# Patient Record
Sex: FEMALE | Race: WHITE | NOT HISPANIC OR LATINO
[De-identification: names, ages, dates, MRNs, and addresses within clinical notes are randomized per-mention and may not be internally consistent; named-entity substitution may affect disease eponyms.]

---

## 2017-01-12 ENCOUNTER — APPOINTMENT (OUTPATIENT)
Dept: UROLOGY | Facility: CLINIC | Age: 42
End: 2017-01-12

## 2017-01-12 DIAGNOSIS — N39.46 MIXED INCONTINENCE: ICD-10-CM

## 2017-02-21 ENCOUNTER — APPOINTMENT (OUTPATIENT)
Dept: RHEUMATOLOGY | Facility: CLINIC | Age: 42
End: 2017-02-21

## 2017-03-28 ENCOUNTER — APPOINTMENT (OUTPATIENT)
Dept: INTERNAL MEDICINE | Facility: CLINIC | Age: 42
End: 2017-03-28

## 2017-03-28 VITALS
HEART RATE: 68 BPM | SYSTOLIC BLOOD PRESSURE: 115 MMHG | TEMPERATURE: 98.1 F | BODY MASS INDEX: 26.58 KG/M2 | DIASTOLIC BLOOD PRESSURE: 70 MMHG | HEIGHT: 63 IN | WEIGHT: 150 LBS

## 2017-03-28 DIAGNOSIS — R39.15 URGENCY OF URINATION: ICD-10-CM

## 2017-03-28 DIAGNOSIS — E11.9 TYPE 2 DIABETES MELLITUS W/OUT COMPLICATIONS: ICD-10-CM

## 2017-03-29 LAB
ALBUMIN SERPL ELPH-MCNC: 4.1 G/DL
ALP BLD-CCNC: 69 U/L
ALT SERPL-CCNC: 32 U/L
ANION GAP SERPL CALC-SCNC: 17 MMOL/L
AST SERPL-CCNC: 42 U/L
BILIRUB SERPL-MCNC: 0.4 MG/DL
BUN SERPL-MCNC: 16 MG/DL
CALCIUM SERPL-MCNC: 9.4 MG/DL
CHLORIDE SERPL-SCNC: 99 MMOL/L
CO2 SERPL-SCNC: 27 MMOL/L
CREAT SERPL-MCNC: 0.72 MG/DL
GLUCOSE SERPL-MCNC: 88 MG/DL
HBA1C MFR BLD HPLC: 8.3 %
POTASSIUM SERPL-SCNC: 4.4 MMOL/L
PROT SERPL-MCNC: 6.9 G/DL
SODIUM SERPL-SCNC: 143 MMOL/L

## 2017-09-29 ENCOUNTER — MEDICATION RENEWAL (OUTPATIENT)
Age: 42
End: 2017-09-29

## 2017-09-29 ENCOUNTER — OTHER (OUTPATIENT)
Age: 42
End: 2017-09-29

## 2019-03-21 ENCOUNTER — INPATIENT (INPATIENT)
Facility: HOSPITAL | Age: 44
LOS: 3 days | Discharge: HOME | DRG: 639 | End: 2019-03-25
Attending: INTERNAL MEDICINE | Admitting: INTERNAL MEDICINE
Payer: COMMERCIAL

## 2019-03-21 VITALS
HEART RATE: 97 BPM | TEMPERATURE: 98 F | RESPIRATION RATE: 17 BRPM | OXYGEN SATURATION: 100 % | WEIGHT: 110.01 LBS | DIASTOLIC BLOOD PRESSURE: 79 MMHG | SYSTOLIC BLOOD PRESSURE: 131 MMHG

## 2019-03-21 DIAGNOSIS — E10.10 TYPE 1 DIABETES MELLITUS WITH KETOACIDOSIS WITHOUT COMA: ICD-10-CM

## 2019-03-21 LAB
ACETONE SERPL-MCNC: ABNORMAL
ALBUMIN SERPL ELPH-MCNC: 3.2 G/DL — LOW (ref 3.5–5)
ALBUMIN SERPL ELPH-MCNC: 4.1 G/DL — SIGNIFICANT CHANGE UP (ref 3.5–5)
ALP SERPL-CCNC: 103 U/L — SIGNIFICANT CHANGE UP (ref 40–120)
ALT FLD-CCNC: 23 U/L DA — SIGNIFICANT CHANGE UP (ref 10–60)
ANION GAP SERPL CALC-SCNC: 10 MMOL/L — SIGNIFICANT CHANGE UP (ref 5–17)
ANION GAP SERPL CALC-SCNC: 19 MMOL/L — HIGH (ref 5–17)
APPEARANCE UR: CLEAR — SIGNIFICANT CHANGE UP
AST SERPL-CCNC: 20 U/L — SIGNIFICANT CHANGE UP (ref 10–40)
BASE EXCESS BLDV CALC-SCNC: -15.9 MMOL/L — LOW (ref -2–2)
BASOPHILS # BLD AUTO: 0.03 K/UL — SIGNIFICANT CHANGE UP (ref 0–0.2)
BASOPHILS NFR BLD AUTO: 0.4 % — SIGNIFICANT CHANGE UP (ref 0–2)
BILIRUB SERPL-MCNC: 0.8 MG/DL — SIGNIFICANT CHANGE UP (ref 0.2–1.2)
BILIRUB UR-MCNC: NEGATIVE — SIGNIFICANT CHANGE UP
BLOOD GAS COMMENTS, VENOUS: SIGNIFICANT CHANGE UP
BUN SERPL-MCNC: 12 MG/DL — SIGNIFICANT CHANGE UP (ref 7–18)
BUN SERPL-MCNC: 9 MG/DL — SIGNIFICANT CHANGE UP (ref 7–18)
CALCIUM SERPL-MCNC: 7.8 MG/DL — LOW (ref 8.4–10.5)
CALCIUM SERPL-MCNC: 8.8 MG/DL — SIGNIFICANT CHANGE UP (ref 8.4–10.5)
CHLORIDE SERPL-SCNC: 103 MMOL/L — SIGNIFICANT CHANGE UP (ref 96–108)
CHLORIDE SERPL-SCNC: 116 MMOL/L — HIGH (ref 96–108)
CHOLEST SERPL-MCNC: 151 MG/DL — SIGNIFICANT CHANGE UP (ref 10–199)
CO2 SERPL-SCNC: 12 MMOL/L — LOW (ref 22–31)
CO2 SERPL-SCNC: 18 MMOL/L — LOW (ref 22–31)
COLOR SPEC: YELLOW — SIGNIFICANT CHANGE UP
CREAT SERPL-MCNC: 0.8 MG/DL — SIGNIFICANT CHANGE UP (ref 0.5–1.3)
CREAT SERPL-MCNC: 1.01 MG/DL — SIGNIFICANT CHANGE UP (ref 0.5–1.3)
DIFF PNL FLD: NEGATIVE — SIGNIFICANT CHANGE UP
EOSINOPHIL # BLD AUTO: 0 K/UL — SIGNIFICANT CHANGE UP (ref 0–0.5)
EOSINOPHIL NFR BLD AUTO: 0 % — SIGNIFICANT CHANGE UP (ref 0–6)
FLU A RESULT: SIGNIFICANT CHANGE UP
FLU A RESULT: SIGNIFICANT CHANGE UP
FLUAV AG NPH QL: SIGNIFICANT CHANGE UP
FLUBV AG NPH QL: SIGNIFICANT CHANGE UP
GLUCOSE SERPL-MCNC: 499 MG/DL — CRITICAL HIGH (ref 70–99)
GLUCOSE SERPL-MCNC: 95 MG/DL — SIGNIFICANT CHANGE UP (ref 70–99)
GLUCOSE UR QL: 1000 MG/DL
HCG SERPL-ACNC: <1 MIU/ML — SIGNIFICANT CHANGE UP
HCO3 BLDV-SCNC: 12 MMOL/L — LOW (ref 21–29)
HCT VFR BLD CALC: 43.4 % — SIGNIFICANT CHANGE UP (ref 34.5–45)
HDLC SERPL-MCNC: 36 MG/DL — LOW
HGB BLD-MCNC: 13.6 G/DL — SIGNIFICANT CHANGE UP (ref 11.5–15.5)
HOROWITZ INDEX BLDV+IHG-RTO: 21 — SIGNIFICANT CHANGE UP
IMM GRANULOCYTES NFR BLD AUTO: 0.3 % — SIGNIFICANT CHANGE UP (ref 0–1.5)
INR BLD: 1 RATIO — SIGNIFICANT CHANGE UP (ref 0.88–1.16)
KETONES UR-MCNC: ABNORMAL
LACTATE SERPL-SCNC: 2.2 MMOL/L — HIGH (ref 0.7–2)
LEUKOCYTE ESTERASE UR-ACNC: NEGATIVE — SIGNIFICANT CHANGE UP
LIDOCAIN IGE QN: 58 U/L — LOW (ref 73–393)
LIPID PNL WITH DIRECT LDL SERPL: 93 MG/DL — SIGNIFICANT CHANGE UP
LYMPHOCYTES # BLD AUTO: 1.54 K/UL — SIGNIFICANT CHANGE UP (ref 1–3.3)
LYMPHOCYTES # BLD AUTO: 21.9 % — SIGNIFICANT CHANGE UP (ref 13–44)
MCHC RBC-ENTMCNC: 30.8 PG — SIGNIFICANT CHANGE UP (ref 27–34)
MCHC RBC-ENTMCNC: 31.3 GM/DL — LOW (ref 32–36)
MCV RBC AUTO: 98.2 FL — SIGNIFICANT CHANGE UP (ref 80–100)
MONOCYTES # BLD AUTO: 0.47 K/UL — SIGNIFICANT CHANGE UP (ref 0–0.9)
MONOCYTES NFR BLD AUTO: 6.7 % — SIGNIFICANT CHANGE UP (ref 2–14)
NEUTROPHILS # BLD AUTO: 4.98 K/UL — SIGNIFICANT CHANGE UP (ref 1.8–7.4)
NEUTROPHILS NFR BLD AUTO: 70.7 % — SIGNIFICANT CHANGE UP (ref 43–77)
NITRITE UR-MCNC: NEGATIVE — SIGNIFICANT CHANGE UP
NRBC # BLD: 0 /100 WBCS — SIGNIFICANT CHANGE UP (ref 0–0)
PCO2 BLDV: 35 MMHG — SIGNIFICANT CHANGE UP (ref 35–50)
PH BLDV: 7.16 — CRITICAL LOW (ref 7.35–7.45)
PH UR: 5 — SIGNIFICANT CHANGE UP (ref 5–8)
PLATELET # BLD AUTO: 236 K/UL — SIGNIFICANT CHANGE UP (ref 150–400)
PO2 BLDV: 53 MMHG — HIGH (ref 25–45)
POTASSIUM SERPL-MCNC: 3.6 MMOL/L — SIGNIFICANT CHANGE UP (ref 3.5–5.3)
POTASSIUM SERPL-MCNC: 5.1 MMOL/L — SIGNIFICANT CHANGE UP (ref 3.5–5.3)
POTASSIUM SERPL-SCNC: 3.6 MMOL/L — SIGNIFICANT CHANGE UP (ref 3.5–5.3)
POTASSIUM SERPL-SCNC: 5.1 MMOL/L — SIGNIFICANT CHANGE UP (ref 3.5–5.3)
PROT SERPL-MCNC: 8.3 G/DL — SIGNIFICANT CHANGE UP (ref 6–8.3)
PROT UR-MCNC: NEGATIVE — SIGNIFICANT CHANGE UP
PROTHROM AB SERPL-ACNC: 11.1 SEC — SIGNIFICANT CHANGE UP (ref 10–12.9)
RBC # BLD: 4.42 M/UL — SIGNIFICANT CHANGE UP (ref 3.8–5.2)
RBC # FLD: 13.2 % — SIGNIFICANT CHANGE UP (ref 10.3–14.5)
RSV RESULT: SIGNIFICANT CHANGE UP
RSV RNA RESP QL NAA+PROBE: SIGNIFICANT CHANGE UP
SAO2 % BLDV: 79 % — SIGNIFICANT CHANGE UP (ref 67–88)
SODIUM SERPL-SCNC: 134 MMOL/L — LOW (ref 135–145)
SODIUM SERPL-SCNC: 144 MMOL/L — SIGNIFICANT CHANGE UP (ref 135–145)
SP GR SPEC: 1.02 — SIGNIFICANT CHANGE UP (ref 1.01–1.02)
TOTAL CHOLESTEROL/HDL RATIO MEASUREMENT: 4.2 RATIO — SIGNIFICANT CHANGE UP (ref 3.3–7.1)
TRIGL SERPL-MCNC: 110 MG/DL — SIGNIFICANT CHANGE UP (ref 10–149)
TSH SERPL-MCNC: 1.43 UU/ML — SIGNIFICANT CHANGE UP (ref 0.34–4.82)
UROBILINOGEN FLD QL: NEGATIVE — SIGNIFICANT CHANGE UP
WBC # BLD: 7.04 K/UL — SIGNIFICANT CHANGE UP (ref 3.8–10.5)
WBC # FLD AUTO: 7.04 K/UL — SIGNIFICANT CHANGE UP (ref 3.8–10.5)

## 2019-03-21 PROCEDURE — 99285 EMERGENCY DEPT VISIT HI MDM: CPT

## 2019-03-21 PROCEDURE — 99291 CRITICAL CARE FIRST HOUR: CPT

## 2019-03-21 PROCEDURE — 71046 X-RAY EXAM CHEST 2 VIEWS: CPT | Mod: 26

## 2019-03-21 PROCEDURE — 93010 ELECTROCARDIOGRAM REPORT: CPT

## 2019-03-21 RX ORDER — DEXTROSE 50 % IN WATER 50 %
50 SYRINGE (ML) INTRAVENOUS ONCE
Qty: 0 | Refills: 0 | Status: COMPLETED | OUTPATIENT
Start: 2019-03-21 | End: 2019-03-21

## 2019-03-21 RX ORDER — SODIUM CHLORIDE 9 MG/ML
1000 INJECTION, SOLUTION INTRAVENOUS ONCE
Qty: 0 | Refills: 0 | Status: COMPLETED | OUTPATIENT
Start: 2019-03-21 | End: 2019-03-21

## 2019-03-21 RX ORDER — DEXTROSE MONOHYDRATE, SODIUM CHLORIDE, AND POTASSIUM CHLORIDE 50; .745; 4.5 G/1000ML; G/1000ML; G/1000ML
1000 INJECTION, SOLUTION INTRAVENOUS
Qty: 0 | Refills: 0 | Status: DISCONTINUED | OUTPATIENT
Start: 2019-03-21 | End: 2019-03-22

## 2019-03-21 RX ORDER — INSULIN LISPRO 100/ML
15 VIAL (ML) SUBCUTANEOUS ONCE
Qty: 0 | Refills: 0 | Status: COMPLETED | OUTPATIENT
Start: 2019-03-21 | End: 2019-03-21

## 2019-03-21 RX ORDER — PANTOPRAZOLE SODIUM 20 MG/1
40 TABLET, DELAYED RELEASE ORAL DAILY
Qty: 0 | Refills: 0 | Status: DISCONTINUED | OUTPATIENT
Start: 2019-03-21 | End: 2019-03-23

## 2019-03-21 RX ORDER — SODIUM CHLORIDE 9 MG/ML
1000 INJECTION INTRAMUSCULAR; INTRAVENOUS; SUBCUTANEOUS
Qty: 0 | Refills: 0 | Status: DISCONTINUED | OUTPATIENT
Start: 2019-03-21 | End: 2019-03-21

## 2019-03-21 RX ORDER — ENOXAPARIN SODIUM 100 MG/ML
40 INJECTION SUBCUTANEOUS DAILY
Qty: 0 | Refills: 0 | Status: DISCONTINUED | OUTPATIENT
Start: 2019-03-21 | End: 2019-03-25

## 2019-03-21 RX ORDER — INFLUENZA VIRUS VACCINE 15; 15; 15; 15 UG/.5ML; UG/.5ML; UG/.5ML; UG/.5ML
0.5 SUSPENSION INTRAMUSCULAR ONCE
Qty: 0 | Refills: 0 | Status: DISCONTINUED | OUTPATIENT
Start: 2019-03-21 | End: 2019-03-25

## 2019-03-21 RX ORDER — SODIUM CHLORIDE 9 MG/ML
1000 INJECTION INTRAMUSCULAR; INTRAVENOUS; SUBCUTANEOUS ONCE
Qty: 0 | Refills: 0 | Status: COMPLETED | OUTPATIENT
Start: 2019-03-21 | End: 2019-03-21

## 2019-03-21 RX ORDER — POTASSIUM CHLORIDE 20 MEQ
10 PACKET (EA) ORAL
Qty: 0 | Refills: 0 | Status: COMPLETED | OUTPATIENT
Start: 2019-03-21 | End: 2019-03-22

## 2019-03-21 RX ORDER — INSULIN HUMAN 100 [IU]/ML
5 INJECTION, SOLUTION SUBCUTANEOUS
Qty: 100 | Refills: 0 | Status: DISCONTINUED | OUTPATIENT
Start: 2019-03-21 | End: 2019-03-22

## 2019-03-21 RX ORDER — INSULIN LISPRO 100/ML
10 VIAL (ML) SUBCUTANEOUS
Qty: 0 | Refills: 0 | COMMUNITY

## 2019-03-21 RX ORDER — ONDANSETRON 8 MG/1
4 TABLET, FILM COATED ORAL ONCE
Qty: 0 | Refills: 0 | Status: COMPLETED | OUTPATIENT
Start: 2019-03-21 | End: 2019-03-21

## 2019-03-21 RX ADMIN — Medication 15 UNIT(S): at 17:34

## 2019-03-21 RX ADMIN — DEXTROSE MONOHYDRATE, SODIUM CHLORIDE, AND POTASSIUM CHLORIDE 100 MILLILITER(S): 50; .745; 4.5 INJECTION, SOLUTION INTRAVENOUS at 22:43

## 2019-03-21 RX ADMIN — Medication 100 MILLIEQUIVALENT(S): at 23:50

## 2019-03-21 RX ADMIN — SODIUM CHLORIDE 4000 MILLILITER(S): 9 INJECTION, SOLUTION INTRAVENOUS at 19:09

## 2019-03-21 RX ADMIN — Medication 100 MILLIEQUIVALENT(S): at 22:44

## 2019-03-21 RX ADMIN — INSULIN HUMAN 5 UNIT(S)/HR: 100 INJECTION, SOLUTION SUBCUTANEOUS at 19:48

## 2019-03-21 RX ADMIN — SODIUM CHLORIDE 4000 MILLILITER(S): 9 INJECTION INTRAMUSCULAR; INTRAVENOUS; SUBCUTANEOUS at 17:34

## 2019-03-21 RX ADMIN — Medication 50 MILLILITER(S): at 22:31

## 2019-03-21 RX ADMIN — ONDANSETRON 4 MILLIGRAM(S): 8 TABLET, FILM COATED ORAL at 17:34

## 2019-03-21 RX ADMIN — SODIUM CHLORIDE 2000 MILLILITER(S): 9 INJECTION, SOLUTION INTRAVENOUS at 19:48

## 2019-03-21 NOTE — H&P ADULT - NSHPLABSRESULTS_GEN_ALL_CORE
Vital Signs Last 24 Hrs  T(C): 36.8 (21 Mar 2019 16:22), Max: 36.8 (21 Mar 2019 16:22)  T(F): 98.3 (21 Mar 2019 16:22), Max: 98.3 (21 Mar 2019 16:22)  HR: 97 (21 Mar 2019 16:22) (97 - 97)  BP: 131/79 (21 Mar 2019 16:22) (131/79 - 131/79)  BP(mean): --  RR: 17 (21 Mar 2019 16:22) (17 - 17)  SpO2: 100% (21 Mar 2019 16:22) (100% - 100%)      Labs:                        13.6   7.04  )-----------( 236      ( 21 Mar 2019 17:40 )             43.4     03-21    134<L>  |  103  |  12  ----------------------------<  499<HH>  5.1   |  12<L>  |  1.01    Ca    8.8      21 Mar 2019 17:40    TPro  8.3  /  Alb  4.1  /  TBili  0.8  /  DBili  x   /  AST  20  /  ALT  23  /  AlkPhos  103  03-21

## 2019-03-21 NOTE — ED ADULT NURSE REASSESSMENT NOTE - NS ED NURSE REASSESS COMMENT FT1
Received pt from PANCHO Garrett, pt is observed laying in bed, breathing room air, in no respiratory distress at time of assessment. Pt is A&O x3, able to make needs known, denies any distress at this time. Insulin started at about 8pm, at 5ml/hr, pt tolerated well. Skin intact, right AC #20Ga and left AC #18Ga in place. Admitted to ICU, report given to PNACHO Stover for ICU room 6. Pt transported on stretcher, on monitor, accompanied by Dr. Carrasco and PANCHO Segura.

## 2019-03-21 NOTE — ED PROVIDER NOTE - CLINICAL SUMMARY MEDICAL DECISION MAKING FREE TEXT BOX
ro dka, prob gastro, clinically unlikely to be surgical abdominal pathology / will check cxr for pna. fluids. insulin. re-assess.

## 2019-03-21 NOTE — ED PROVIDER NOTE - CRITICAL CARE PROVIDED
interpretation of diagnostic studies/consultation with other physicians/additional history taking/documentation/consult w/ pt's family directly relating to pts condition/direct patient care (not related to procedure)

## 2019-03-21 NOTE — ED PROVIDER NOTE - PROGRESS NOTE DETAILS
pt w dka, no sign that it is secondary to another treatable illness. will admit to icu / insulin gtt / fluids

## 2019-03-21 NOTE — H&P ADULT - ATTENDING COMMENTS
Patient is a 42 y/o female with h/o type 1 DM who had been managed with an insulin pump that was discontinued and now presents with nausea and vomiting with blood sugar readings of >500. Her anion gap is 19 and our electrolyte profile shows a blood sugar of 499 mg/dl. The serum and urine are + for acetone. The patient received 1L isotonic fluid in the ED and is admitted to ICU for further fluid resuscitation and an insulin drip. There is no c/o chest pain, fever, cough or dysuria. CXR is without infiltrate. Dx- Diabetic ketoacidosis. I reviewed all labs, xrays and other provider notes. Critical care time 30 minutes.

## 2019-03-21 NOTE — H&P ADULT - NSHPPHYSICALEXAM_GEN_ALL_CORE
PHYSICAL EXAM:    GENERAL: dehydrated    HEAD:  Atraumatic, Normocephalic    NECK: Supple, No JVD    CHEST/LUNG: Clear to auscultation bilaterally; No wheeze    HEART: Regular rate and rhythm; No murmurs, rubs, or gallops    ABDOMEN: Soft, mildly tender on epigastric and LUQ, Nondistended; Bowel sounds present    EXTREMITIES:  2+ Peripheral Pulses, No clubbing, cyanosis, or edema    PSYCH: AAOx3    NEUROLOGY: non-focal    SKIN: No rashes or lesions    No other pertinent positive finding except mentioned as above.

## 2019-03-21 NOTE — H&P ADULT - NSHPREVIEWOFSYSTEMS_GEN_ALL_CORE
REVIEW OF SYSTEMS:    CONSTITUTIONAL: No weakness, fevers or chills  EYES/ENT: No visual changes;  No vertigo or throat pain   NECK: No pain or stiffness  RESPIRATORY: No cough, wheezing, hemoptysis; No shortness of breath  CARDIOVASCULAR: No chest pain or palpitations  GASTROINTESTINAL: mild epigastric pain with nausea, vomiting, NO hematemesis; No diarrhea or constipation. No melena or hematochezia.  GENITOURINARY: No dysuria, frequency or hematuria  NEUROLOGICAL: No numbness or weakness  SKIN: No itching, rashes  No other complaint except mentioned as above.

## 2019-03-21 NOTE — ED ADULT NURSE NOTE - NSIMPLEMENTINTERV_GEN_ALL_ED
Implemented All Universal Safety Interventions:  [place] to call system. Call bell, personal items and telephone within reach. Instruct patient to call for assistance. Room bathroom lighting operational. Non-slip footwear when patient is off stretcher. Physically safe environment: no spills, clutter or unnecessary equipment. Stretcher in lowest position, wheels locked, appropriate side rails in place.

## 2019-03-21 NOTE — ED PROVIDER NOTE - OBJECTIVE STATEMENT
43 female hx t1dm since childhood, used to be on pump but since insurance changed 1-2 yrs ago is on glargine 10 humalog 10 tid. does not follow w endocrine any longer. does correct 1 unit per 15g carbs but no correction for bg level. has had vomiting and nausea for 2-3 days and now w 1 day LUQ discomfort. no cp or sob. no pleuritic. has been ok to take insulin but her bg have been reading over 600. dw ems and they state bg was HI. give 500 cc fluids pta.

## 2019-03-21 NOTE — H&P ADULT - ASSESSMENT
43 F with DM 1 presented with DKA with .     1)DKA  , HCO2 12, AG 19, Acetone large, pH 7.16   s/p 2L NS in ED  -will give another LR bolus start on insulin drip   -can start on NS, K5.1- no need for K replacement now  -will repeat BMP, lactate, VBG Q4 and adjust IVF and K accordingly  -NPO     2)Metabolic acidosis from DKA  IV hydration and repeat VBG Q4 with lab for now until pH normalize    3)DVT and GI PPx  Protonix and Levonox

## 2019-03-21 NOTE — ED PROVIDER NOTE - PHYSICAL EXAMINATION
Gen: well appearing, of stated age, no acute distress; Head: NC, AT; ENT: MMM, no uvular deviation; Neck: supple with full ROM; Chest: CTAB, no retractions, rate normal, appears to breath comfortable; Heart: RRR S1S2 No JVD No peripheral edema b/l ; Abd: Soft mild luq tender, no rebound or guarding, no masses, no keene sign, no mcburney tenderness, no CVAT; Back: No spinal deformity; Ext: Moving all 4 limbs without obvious impairment to ROM, no obvious weakness; Neuro: fluid speech, no focal deficits, oriented to person, place, situation; Psych: No anxiety, depression or pressured speech noted; Skin: no utricaria, no diffuse rash. -ncohen

## 2019-03-21 NOTE — H&P ADULT - HISTORY OF PRESENT ILLNESS
43 F with history of DM type 1 diagnosed at 7 year old  from home presented with nausea and vomiting for 1 days and found to have BS >500 at home. Patient was on insulin pump since she was diagnosed at 7 and cut off 2 years ago for insurance and started on Basaglar 10 U at night and Humalog 10U mealtime/TID and compliance with medication and diet. Her BS range from 40 to 200s, did not have severe hyperglycemic attack since she was diagnosed. Pt also endorsed some discomfort in epigastric and LUQ area. Denied recent flu, diarrhea, fever, chills, dysuria and any other symptoms.   In ED,  vitals were stable. , HCO2 12, AG 19, Acetone large, pH 7.16 on presentation. pt receive 2L NS and 15U Humalog

## 2019-03-22 LAB
24R-OH-CALCIDIOL SERPL-MCNC: 13.7 NG/ML — LOW (ref 30–80)
ALBUMIN SERPL ELPH-MCNC: 3 G/DL — LOW (ref 3.5–5)
ALP SERPL-CCNC: 77 U/L — SIGNIFICANT CHANGE UP (ref 40–120)
ALT FLD-CCNC: 19 U/L DA — SIGNIFICANT CHANGE UP (ref 10–60)
ANION GAP SERPL CALC-SCNC: 6 MMOL/L — SIGNIFICANT CHANGE UP (ref 5–17)
ANION GAP SERPL CALC-SCNC: 9 MMOL/L — SIGNIFICANT CHANGE UP (ref 5–17)
AST SERPL-CCNC: 16 U/L — SIGNIFICANT CHANGE UP (ref 10–40)
BASE EXCESS BLDV CALC-SCNC: -6.6 MMOL/L — LOW (ref -2–2)
BASOPHILS # BLD AUTO: 0.05 K/UL — SIGNIFICANT CHANGE UP (ref 0–0.2)
BASOPHILS NFR BLD AUTO: 0.5 % — SIGNIFICANT CHANGE UP (ref 0–2)
BILIRUB SERPL-MCNC: 0.5 MG/DL — SIGNIFICANT CHANGE UP (ref 0.2–1.2)
BLOOD GAS COMMENTS, VENOUS: SIGNIFICANT CHANGE UP
BUN SERPL-MCNC: 7 MG/DL — SIGNIFICANT CHANGE UP (ref 7–18)
BUN SERPL-MCNC: 8 MG/DL — SIGNIFICANT CHANGE UP (ref 7–18)
CALCIUM SERPL-MCNC: 7.7 MG/DL — LOW (ref 8.4–10.5)
CALCIUM SERPL-MCNC: 7.9 MG/DL — LOW (ref 8.4–10.5)
CHLORIDE SERPL-SCNC: 113 MMOL/L — HIGH (ref 96–108)
CHLORIDE SERPL-SCNC: 114 MMOL/L — HIGH (ref 96–108)
CO2 SERPL-SCNC: 18 MMOL/L — LOW (ref 22–31)
CO2 SERPL-SCNC: 21 MMOL/L — LOW (ref 22–31)
CREAT SERPL-MCNC: 0.64 MG/DL — SIGNIFICANT CHANGE UP (ref 0.5–1.3)
CREAT SERPL-MCNC: 0.66 MG/DL — SIGNIFICANT CHANGE UP (ref 0.5–1.3)
CULTURE RESULTS: NO GROWTH — SIGNIFICANT CHANGE UP
EOSINOPHIL # BLD AUTO: 0.04 K/UL — SIGNIFICANT CHANGE UP (ref 0–0.5)
EOSINOPHIL NFR BLD AUTO: 0.4 % — SIGNIFICANT CHANGE UP (ref 0–6)
FOLATE SERPL-MCNC: 10.9 NG/ML — SIGNIFICANT CHANGE UP
GLUCOSE SERPL-MCNC: 109 MG/DL — HIGH (ref 70–99)
GLUCOSE SERPL-MCNC: 206 MG/DL — HIGH (ref 70–99)
HCO3 BLDV-SCNC: 19 MMOL/L — LOW (ref 21–29)
HCT VFR BLD CALC: 36.3 % — SIGNIFICANT CHANGE UP (ref 34.5–45)
HGB BLD-MCNC: 11.5 G/DL — SIGNIFICANT CHANGE UP (ref 11.5–15.5)
HOROWITZ INDEX BLDV+IHG-RTO: 21 — SIGNIFICANT CHANGE UP
IMM GRANULOCYTES NFR BLD AUTO: 0.2 % — SIGNIFICANT CHANGE UP (ref 0–1.5)
LACTATE SERPL-SCNC: 0.6 MMOL/L — LOW (ref 0.7–2)
LYMPHOCYTES # BLD AUTO: 1.86 K/UL — SIGNIFICANT CHANGE UP (ref 1–3.3)
LYMPHOCYTES # BLD AUTO: 18.2 % — SIGNIFICANT CHANGE UP (ref 13–44)
MAGNESIUM SERPL-MCNC: 1.5 MG/DL — LOW (ref 1.6–2.6)
MAGNESIUM SERPL-MCNC: 1.8 MG/DL — SIGNIFICANT CHANGE UP (ref 1.6–2.6)
MCHC RBC-ENTMCNC: 30.6 PG — SIGNIFICANT CHANGE UP (ref 27–34)
MCHC RBC-ENTMCNC: 31.7 GM/DL — LOW (ref 32–36)
MCV RBC AUTO: 96.5 FL — SIGNIFICANT CHANGE UP (ref 80–100)
MONOCYTES # BLD AUTO: 0.94 K/UL — HIGH (ref 0–0.9)
MONOCYTES NFR BLD AUTO: 9.2 % — SIGNIFICANT CHANGE UP (ref 2–14)
NEUTROPHILS # BLD AUTO: 7.31 K/UL — SIGNIFICANT CHANGE UP (ref 1.8–7.4)
NEUTROPHILS NFR BLD AUTO: 71.5 % — SIGNIFICANT CHANGE UP (ref 43–77)
NRBC # BLD: 0 /100 WBCS — SIGNIFICANT CHANGE UP (ref 0–0)
PCO2 BLDV: 40 MMHG — SIGNIFICANT CHANGE UP (ref 35–50)
PH BLDV: 7.3 — LOW (ref 7.35–7.45)
PHOSPHATE SERPL-MCNC: 2.8 MG/DL — SIGNIFICANT CHANGE UP (ref 2.5–4.5)
PLATELET # BLD AUTO: 200 K/UL — SIGNIFICANT CHANGE UP (ref 150–400)
PO2 BLDV: 67 MMHG — HIGH (ref 25–45)
POTASSIUM SERPL-MCNC: 4.3 MMOL/L — SIGNIFICANT CHANGE UP (ref 3.5–5.3)
POTASSIUM SERPL-MCNC: 4.3 MMOL/L — SIGNIFICANT CHANGE UP (ref 3.5–5.3)
POTASSIUM SERPL-SCNC: 4.3 MMOL/L — SIGNIFICANT CHANGE UP (ref 3.5–5.3)
POTASSIUM SERPL-SCNC: 4.3 MMOL/L — SIGNIFICANT CHANGE UP (ref 3.5–5.3)
PROT SERPL-MCNC: 6.3 G/DL — SIGNIFICANT CHANGE UP (ref 6–8.3)
RBC # BLD: 3.76 M/UL — LOW (ref 3.8–5.2)
RBC # FLD: 13.2 % — SIGNIFICANT CHANGE UP (ref 10.3–14.5)
SAO2 % BLDV: 93 % — HIGH (ref 67–88)
SODIUM SERPL-SCNC: 140 MMOL/L — SIGNIFICANT CHANGE UP (ref 135–145)
SODIUM SERPL-SCNC: 141 MMOL/L — SIGNIFICANT CHANGE UP (ref 135–145)
SPECIMEN SOURCE: SIGNIFICANT CHANGE UP
VIT B12 SERPL-MCNC: 819 PG/ML — SIGNIFICANT CHANGE UP (ref 232–1245)
WBC # BLD: 10.22 K/UL — SIGNIFICANT CHANGE UP (ref 3.8–10.5)
WBC # FLD AUTO: 10.22 K/UL — SIGNIFICANT CHANGE UP (ref 3.8–10.5)

## 2019-03-22 RX ORDER — INSULIN LISPRO 100/ML
VIAL (ML) SUBCUTANEOUS AT BEDTIME
Qty: 0 | Refills: 0 | Status: DISCONTINUED | OUTPATIENT
Start: 2019-03-22 | End: 2019-03-25

## 2019-03-22 RX ORDER — ERGOCALCIFEROL 1.25 MG/1
50000 CAPSULE ORAL
Qty: 0 | Refills: 0 | Status: DISCONTINUED | OUTPATIENT
Start: 2019-03-22 | End: 2019-03-25

## 2019-03-22 RX ORDER — INSULIN LISPRO 100/ML
VIAL (ML) SUBCUTANEOUS
Qty: 0 | Refills: 0 | Status: DISCONTINUED | OUTPATIENT
Start: 2019-03-22 | End: 2019-03-25

## 2019-03-22 RX ORDER — ACETAMINOPHEN 500 MG
650 TABLET ORAL EVERY 6 HOURS
Qty: 0 | Refills: 0 | Status: DISCONTINUED | OUTPATIENT
Start: 2019-03-22 | End: 2019-03-25

## 2019-03-22 RX ORDER — INSULIN GLARGINE 100 [IU]/ML
8 INJECTION, SOLUTION SUBCUTANEOUS ONCE
Qty: 0 | Refills: 0 | Status: COMPLETED | OUTPATIENT
Start: 2019-03-22 | End: 2019-03-22

## 2019-03-22 RX ORDER — INSULIN GLARGINE 100 [IU]/ML
8 INJECTION, SOLUTION SUBCUTANEOUS EVERY MORNING
Qty: 0 | Refills: 0 | Status: DISCONTINUED | OUTPATIENT
Start: 2019-03-23 | End: 2019-03-25

## 2019-03-22 RX ORDER — MAGNESIUM SULFATE 500 MG/ML
2 VIAL (ML) INJECTION ONCE
Qty: 0 | Refills: 0 | Status: COMPLETED | OUTPATIENT
Start: 2019-03-22 | End: 2019-03-22

## 2019-03-22 RX ORDER — ACETAMINOPHEN 500 MG
650 TABLET ORAL ONCE
Qty: 0 | Refills: 0 | Status: COMPLETED | OUTPATIENT
Start: 2019-03-22 | End: 2019-03-22

## 2019-03-22 RX ADMIN — ENOXAPARIN SODIUM 40 MILLIGRAM(S): 100 INJECTION SUBCUTANEOUS at 11:46

## 2019-03-22 RX ADMIN — Medication 650 MILLIGRAM(S): at 02:51

## 2019-03-22 RX ADMIN — DEXTROSE MONOHYDRATE, SODIUM CHLORIDE, AND POTASSIUM CHLORIDE 100 MILLILITER(S): 50; .745; 4.5 INJECTION, SOLUTION INTRAVENOUS at 09:20

## 2019-03-22 RX ADMIN — ERGOCALCIFEROL 50000 UNIT(S): 1.25 CAPSULE ORAL at 17:04

## 2019-03-22 RX ADMIN — Medication 650 MILLIGRAM(S): at 15:41

## 2019-03-22 RX ADMIN — Medication 650 MILLIGRAM(S): at 03:30

## 2019-03-22 RX ADMIN — INSULIN GLARGINE 8 UNIT(S): 100 INJECTION, SOLUTION SUBCUTANEOUS at 04:33

## 2019-03-22 RX ADMIN — Medication 100 MILLIEQUIVALENT(S): at 00:47

## 2019-03-22 RX ADMIN — Medication 650 MILLIGRAM(S): at 13:54

## 2019-03-22 RX ADMIN — PANTOPRAZOLE SODIUM 40 MILLIGRAM(S): 20 TABLET, DELAYED RELEASE ORAL at 11:46

## 2019-03-22 RX ADMIN — Medication 50 GRAM(S): at 04:40

## 2019-03-22 NOTE — PROGRESS NOTE ADULT - SUBJECTIVE AND OBJECTIVE BOX
INTERVAL HPI/OVERNIGHT EVENTS: Pt was seen and examined at bedside.     Hematalogic:  enoxaparin Injectable 40 milliGRAM(s) SubCutaneous daily    Other:  dextrose 5% + sodium chloride 0.9% with potassium chloride 20 mEq/L 1000 milliLiter(s) IV Continuous <Continuous>  influenza   Vaccine 0.5 milliLiter(s) IntraMuscular once  insulin lispro (HumaLOG) corrective regimen sliding scale   SubCutaneous three times a day before meals  insulin lispro (HumaLOG) corrective regimen sliding scale   SubCutaneous at bedtime  pantoprazole  Injectable 40 milliGRAM(s) IV Push daily    dextrose 5% + sodium chloride 0.9% with potassium chloride 20 mEq/L 1000 milliLiter(s) IV Continuous <Continuous>  enoxaparin Injectable 40 milliGRAM(s) SubCutaneous daily  influenza   Vaccine 0.5 milliLiter(s) IntraMuscular once  insulin lispro (HumaLOG) corrective regimen sliding scale   SubCutaneous three times a day before meals  insulin lispro (HumaLOG) corrective regimen sliding scale   SubCutaneous at bedtime  pantoprazole  Injectable 40 milliGRAM(s) IV Push daily    Drug Dosing Weight    Weight (kg): 59.5 (21 Mar 2019 21:07)    PMH -reviewed admission note, no change since admission    ICU Vital Signs Last 24 Hrs  T(C): 36.7 (22 Mar 2019 09:00), Max: 37 (21 Mar 2019 21:07)  T(F): 98.1 (22 Mar 2019 09:00), Max: 98.6 (21 Mar 2019 21:07)  HR: 77 (22 Mar 2019 10:00) (73 - 97)  BP: 115/63 (22 Mar 2019 10:00) (102/54 - 131/79)  BP(mean): 75 (22 Mar 2019 10:00) (65 - 78)  ABP: --  ABP(mean): --  RR: 13 (22 Mar 2019 10:00) (10 - 21)  SpO2: 100% (22 Mar 2019 10:00) (98% - 100%)             @ 07:01  -   @ 07:00  --------------------------------------------------------  IN: 1308 mL / OUT: 350 mL / NET: 958 mL            PHYSICAL EXAM:    GENERAL: NAD, well-groomed, well-developed  HEAD:  Atraumatic, Normocephalic  EYES: EOMI, PERRLA, conjunctiva and sclera clear  ENMT: No tonsillar erythema, exudates, or enlargement; Moist mucous membranes, Good dentition, No lesions  NECK: Supple, normal appearance, No JVD; Normal thyroid; Trachea midline  NERVOUS SYSTEM:  Alert & Oriented X3, Good concentration; Motor Strength 5/5 B/L upper and lower extremities; DTRs 2+ intact and symmetric  CHEST/LUNG: No chest deformity; Normal percussion bilaterally; No rales, rhonchi, wheezing   HEART: Regular rate and rhythm; No murmurs, rubs, or gallops  ABDOMEN: Soft, Nontender, Nondistended; Bowel sounds present  EXTREMITIES:  2+ Peripheral Pulses, No clubbing, cyanosis, or edema  LYMPH: No lymphadenopathy noted  SKIN: No rashes or lesions; Good capillary refill      LABS:  CBC Full  -  ( 22 Mar 2019 02:30 )  WBC Count : 10.22 K/uL  Hemoglobin : 11.5 g/dL  Hematocrit : 36.3 %  Platelet Count - Automated : 200 K/uL  Mean Cell Volume : 96.5 fl  Mean Cell Hemoglobin : 30.6 pg  Mean Cell Hemoglobin Concentration : 31.7 gm/dL  Auto Neutrophil # : 7.31 K/uL  Auto Lymphocyte # : 1.86 K/uL  Auto Monocyte # : 0.94 K/uL  Auto Eosinophil # : 0.04 K/uL  Auto Basophil # : 0.05 K/uL  Auto Neutrophil % : 71.5 %  Auto Lymphocyte % : 18.2 %  Auto Monocyte % : 9.2 %  Auto Eosinophil % : 0.4 %  Auto Basophil % : 0.5 %        140  |  113<H>  |  7   ----------------------------<  206<H>  4.3   |  21<L>  |  0.66    Ca    7.9<L>      22 Mar 2019 09:57  Phos  2.8       Mg     1.8         TPro  6.3  /  Alb  3.0<L>  /  TBili  0.5  /  DBili  x   /  AST  16  /  ALT  19  /  AlkPhos  77      PT/INR - ( 21 Mar 2019 21:35 )   PT: 11.1 sec;   INR: 1.00 ratio           Urinalysis Basic - ( 21 Mar 2019 18:27 )    Color: Yellow / Appearance: Clear / S.020 / pH: x  Gluc: x / Ketone: Large  / Bili: Negative / Urobili: Negative   Blood: x / Protein: Negative / Nitrite: Negative   Leuk Esterase: Negative / RBC: x / WBC x   Sq Epi: x / Non Sq Epi: x / Bacteria: x          RADIOLOGY & ADDITIONAL STUDIES REVIEWED:  ***    GOALS OF CARE DISCUSSION WITH PATIENT/FAMILY/PROXY:    CRITICAL CARE TIME SPENT: 35 minutes

## 2019-03-22 NOTE — PROGRESS NOTE ADULT - SUBJECTIVE AND OBJECTIVE BOX
INTERVAL HPI/OVERNIGHT EVENTS:       Antimicrobial:    Cardiovascular:    Pulmonary:    Hematalogic:  enoxaparin Injectable 40 milliGRAM(s) SubCutaneous daily    Other:  acetaminophen   Tablet .. 650 milliGRAM(s) Oral every 6 hours PRN  influenza   Vaccine 0.5 milliLiter(s) IntraMuscular once  insulin lispro (HumaLOG) corrective regimen sliding scale   SubCutaneous three times a day before meals  insulin lispro (HumaLOG) corrective regimen sliding scale   SubCutaneous at bedtime  pantoprazole  Injectable 40 milliGRAM(s) IV Push daily      Drug Dosing Weight    Weight (kg): 59.5 (21 Mar 2019 21:07)    CENTRAL LINE: [ ] YES [ ] NO  LOCATION:   DATE INSERTED:    BEACH: [ ] YES [ ] NO    DATE INSERTED:    A-LINE:  [ ] YES [ ] NO  LOCATION:   DATE INSERTED:    PMH/Social Hx/Fam Hx -reviewed admission note, no change since admission  PAST MEDICAL & SURGICAL HISTORY:  No pertinent past medical history      T(C): 36.7 (19 @ 09:00), Max: 37 (19 @ 21:07)  HR: 77 (19 @ 13:13)  BP: 102/55 (19 @ 13:13)  BP(mean): 66 (19 @ 13:13)  ABP: --  ABP(mean): --  RR: 18 (19 @ 13:13)  SpO2: 100% (19 @ 13:13)  Wt(kg): --           @ 07:01  -   @ 07:00  --------------------------------------------------------  IN: 1308 mL / OUT: 350 mL / NET: 958 mL            PHYSICAL EXAM:    GENERAL: No signs of distress, comfortable  HEAD: Atraumatic, Normocephalic  EYES: EOMI, PERRLA  ENMT: No erythema, exudates, or enlargement, Moist mucous membranes  NECK: Supple, normal appearance, No JVD; [  ] central line (if applicable)  CHEST/LUNG: No chest deformity, fair bilateral air entry; No rales, rhonchi, wheezing; crackles  HEART: Regular rate and rhythm; No murmurs, rubs, or gallops;   ABDOMEN: Soft, Nontender, Nondistended; Bowel sounds present  EXTREMITIES:  + Peripheral Pulses, No clubbing, cyanosis, or edema  NERVOUS SYSTEM: awake and alert x 3, follows commands, upper and lower extremities  LYMPH: No lymphadenopathy noted  SKIN: No rashes or lesions; good turgor, warm, dry      LABS:  CBC Full  -  ( 22 Mar 2019 02:30 )  WBC Count : 10.22 K/uL  Hemoglobin : 11.5 g/dL  Hematocrit : 36.3 %  Platelet Count - Automated : 200 K/uL  Mean Cell Volume : 96.5 fl  Mean Cell Hemoglobin : 30.6 pg  Mean Cell Hemoglobin Concentration : 31.7 gm/dL  Auto Neutrophil # : 7.31 K/uL  Auto Lymphocyte # : 1.86 K/uL  Auto Monocyte # : 0.94 K/uL  Auto Eosinophil # : 0.04 K/uL  Auto Basophil # : 0.05 K/uL  Auto Neutrophil % : 71.5 %  Auto Lymphocyte % : 18.2 %  Auto Monocyte % : 9.2 %  Auto Eosinophil % : 0.4 %  Auto Basophil % : 0.5 %        140  |  113<H>  |  7   ----------------------------<  206<H>  4.3   |  21<L>  |  0.66    Ca    7.9<L>      22 Mar 2019 09:57  Phos  2.8       Mg     1.8         TPro  6.3  /  Alb  3.0<L>  /  TBili  0.5  /  DBili  x   /  AST  16  /  ALT  19  /  AlkPhos  77  03-22    PT/INR - ( 21 Mar 2019 21:35 )   PT: 11.1 sec;   INR: 1.00 ratio           Urinalysis Basic - ( 21 Mar 2019 18:27 )    Color: Yellow / Appearance: Clear / S.020 / pH: x  Gluc: x / Ketone: Large  / Bili: Negative / Urobili: Negative   Blood: x / Protein: Negative / Nitrite: Negative   Leuk Esterase: Negative / RBC: x / WBC x   Sq Epi: x / Non Sq Epi: x / Bacteria: x          RADIOLOGY & ADDITIONAL STUDIES REVIEWED         IMPRESSION:  PAST MEDICAL & SURGICAL HISTORY:  No pertinent past medical history   p/w       IMP:43 F with history of DM type 1 diagnosed at 7 year old  from home presented with nausea and vomiting for 1 days and found to have BS >500 at home. Patient was on insulin pump since she was diagnosed at 7 and cut off 2 years ago for insurance and started on Basaglar 10 U at night and Humalog 10U mealtime/TID and compliance with medication and diet. Her BS range from 40 to 200s, did not have severe hyperglycemic attack since she was diagnosed. Pt also endorsed some discomfort in epigastric and LUQ area. Denied recent flu, diarrhea, fever, chills, dysuria and any other symptoms.   In ED,  vitals were stable. , HCO2 12, AG 19, Acetone large, pH 7.16 on presentation. pt receive 2L NS and 15U Humalog  Pat admitted to icu for DKA, severe metabolic acidosis and gastroenteritis          Plan:      CNS: none    PULMONARY: o2 supp    CARDIAC: monitor    GI: npo. ivf    RENAL: monitor eletrolytes    SKIN: no issue    MUSCULOSKELETAL:boots    ID: no issue    HEME: monitor    DVT and GI Prophylaxis    GOALS OF CARE DISCUSSION WITH PATIENT/FAMILY/PROXY/ icu kiya and jammie rizvi bedside    CRITICAL CARE TIME SPENT: 35 minutes

## 2019-03-23 LAB
ANION GAP SERPL CALC-SCNC: 9 MMOL/L — SIGNIFICANT CHANGE UP (ref 5–17)
BUN SERPL-MCNC: 8 MG/DL — SIGNIFICANT CHANGE UP (ref 7–18)
CALCIUM SERPL-MCNC: 7.9 MG/DL — LOW (ref 8.4–10.5)
CHLORIDE SERPL-SCNC: 109 MMOL/L — HIGH (ref 96–108)
CO2 SERPL-SCNC: 22 MMOL/L — SIGNIFICANT CHANGE UP (ref 22–31)
CREAT SERPL-MCNC: 0.6 MG/DL — SIGNIFICANT CHANGE UP (ref 0.5–1.3)
GLUCOSE SERPL-MCNC: 324 MG/DL — HIGH (ref 70–99)
HCT VFR BLD CALC: 36.7 % — SIGNIFICANT CHANGE UP (ref 34.5–45)
HGB BLD-MCNC: 11.8 G/DL — SIGNIFICANT CHANGE UP (ref 11.5–15.5)
MAGNESIUM SERPL-MCNC: 1.7 MG/DL — SIGNIFICANT CHANGE UP (ref 1.6–2.6)
MCHC RBC-ENTMCNC: 30.6 PG — SIGNIFICANT CHANGE UP (ref 27–34)
MCHC RBC-ENTMCNC: 32.2 GM/DL — SIGNIFICANT CHANGE UP (ref 32–36)
MCV RBC AUTO: 95.1 FL — SIGNIFICANT CHANGE UP (ref 80–100)
NRBC # BLD: 0 /100 WBCS — SIGNIFICANT CHANGE UP (ref 0–0)
PHOSPHATE SERPL-MCNC: 3.1 MG/DL — SIGNIFICANT CHANGE UP (ref 2.5–4.5)
PLATELET # BLD AUTO: 162 K/UL — SIGNIFICANT CHANGE UP (ref 150–400)
POTASSIUM SERPL-MCNC: 4.1 MMOL/L — SIGNIFICANT CHANGE UP (ref 3.5–5.3)
POTASSIUM SERPL-SCNC: 4.1 MMOL/L — SIGNIFICANT CHANGE UP (ref 3.5–5.3)
RBC # BLD: 3.86 M/UL — SIGNIFICANT CHANGE UP (ref 3.8–5.2)
RBC # FLD: 13.1 % — SIGNIFICANT CHANGE UP (ref 10.3–14.5)
SODIUM SERPL-SCNC: 140 MMOL/L — SIGNIFICANT CHANGE UP (ref 135–145)
WBC # BLD: 4.91 K/UL — SIGNIFICANT CHANGE UP (ref 3.8–10.5)
WBC # FLD AUTO: 4.91 K/UL — SIGNIFICANT CHANGE UP (ref 3.8–10.5)

## 2019-03-23 RX ORDER — PANTOPRAZOLE SODIUM 20 MG/1
40 TABLET, DELAYED RELEASE ORAL
Qty: 0 | Refills: 0 | Status: DISCONTINUED | OUTPATIENT
Start: 2019-03-23 | End: 2019-03-25

## 2019-03-23 RX ORDER — INSULIN LISPRO 100/ML
10 VIAL (ML) SUBCUTANEOUS
Qty: 0 | Refills: 0 | Status: DISCONTINUED | OUTPATIENT
Start: 2019-03-23 | End: 2019-03-25

## 2019-03-23 RX ADMIN — Medication 10 UNIT(S): at 17:31

## 2019-03-23 RX ADMIN — ENOXAPARIN SODIUM 40 MILLIGRAM(S): 100 INJECTION SUBCUTANEOUS at 11:36

## 2019-03-23 RX ADMIN — INSULIN GLARGINE 8 UNIT(S): 100 INJECTION, SOLUTION SUBCUTANEOUS at 08:29

## 2019-03-23 RX ADMIN — Medication 4: at 06:36

## 2019-03-23 RX ADMIN — Medication 10 UNIT(S): at 11:35

## 2019-03-23 NOTE — PROGRESS NOTE ADULT - SUBJECTIVE AND OBJECTIVE BOX
INTERVAL HPI/OVERNIGHT EVENTS: patient assessed bedside, reports no acute complaints    PRESSORS: [ ] YES [ x ] NO  WHICH:    ANTIBIOTICS:                  DATE STARTED:  ANTIBIOTICS:                  DATE STARTED:  ANTIBIOTICS:                  DATE STARTED:    Antimicrobial:    Cardiovascular:    Pulmonary:    Hematalogic:  enoxaparin Injectable 40 milliGRAM(s) SubCutaneous daily    Other:  acetaminophen   Tablet .. 650 milliGRAM(s) Oral every 6 hours PRN  ergocalciferol 37006 Unit(s) Oral <User Schedule>  influenza   Vaccine 0.5 milliLiter(s) IntraMuscular once  insulin glargine Injectable (LANTUS) 8 Unit(s) SubCutaneous every morning  insulin lispro (HumaLOG) corrective regimen sliding scale   SubCutaneous three times a day before meals  insulin lispro (HumaLOG) corrective regimen sliding scale   SubCutaneous at bedtime  pantoprazole  Injectable 40 milliGRAM(s) IV Push daily    acetaminophen   Tablet .. 650 milliGRAM(s) Oral every 6 hours PRN  enoxaparin Injectable 40 milliGRAM(s) SubCutaneous daily  ergocalciferol 73516 Unit(s) Oral <User Schedule>  influenza   Vaccine 0.5 milliLiter(s) IntraMuscular once  insulin glargine Injectable (LANTUS) 8 Unit(s) SubCutaneous every morning  insulin lispro (HumaLOG) corrective regimen sliding scale   SubCutaneous three times a day before meals  insulin lispro (HumaLOG) corrective regimen sliding scale   SubCutaneous at bedtime  pantoprazole  Injectable 40 milliGRAM(s) IV Push daily    Drug Dosing Weight    Weight (kg): 59.5 (21 Mar 2019 21:07)    CENTRAL LINE: [ ] YES [ x ] NO  LOCATION:         BEACH: [ ] YES [ x ] NO          A-LINE:  [ ] YES [ x ] NO  LOCATION:             ICU Vital Signs Last 24 Hrs  T(C): 36.3 (23 Mar 2019 06:07), Max: 36.8 (22 Mar 2019 16:13)  T(F): 97.3 (23 Mar 2019 06:07), Max: 98.2 (22 Mar 2019 16:13)  HR: 75 (23 Mar 2019 06:00) (72 - 89)  BP: 129/75 (23 Mar 2019 06:00) (102/55 - 131/74)  BP(mean): 88 (23 Mar 2019 06:00) (66 - 88)  ABP: --  ABP(mean): --  RR: 12 (23 Mar 2019 06:00) (0 - 20)  SpO2: 99% (23 Mar 2019 06:00) (98% - 100%)             @ 07:01  -   @ 07:00  --------------------------------------------------------  IN: 1490 mL / OUT: 650 mL / NET: 840 mL            REVIEW OF SYSTEMS:    CONSTITUTIONAL: No weakness, fevers or chills  NECK: No pain or stiffness  RESPIRATORY: No cough, wheezing, hemoptysis; No shortness of breath  CARDIOVASCULAR: No chest pain or palpitations  GASTROINTESTINAL: No abdominal or epigastric pain. No nausea, vomiting, No diarrhea or constipation. No melena or hematochezia.  GENITOURINARY: No dysuria, frequency or hematuria  NEUROLOGICAL: No numbness or weakness  All other review of systems is negative unless indicated above      PHYSICAL EXAM:    GENERAL: NAD, well-groomed, well-developed  EYES: EOMI, PERRLA,   NECK: Supple, No JVD; Normal thyroid; Trachea midline  NERVOUS SYSTEM:  Alert & Oriented X3,  Motor Strength 5/5 B/L upper and lower extremities; DTRs 2+ intact and symmetric  CHEST/LUNG: No rales, rhonchi, wheezing   HEART: Regular rate and rhythm; No murmurs,   ABDOMEN: Soft, Nontender, Nondistended; Bowel sounds present  EXTREMITIES:  2+ Peripheral Pulses, No clubbing, cyanosis, or edema        LABS:  CBC Full  -  ( 23 Mar 2019 06:29 )  WBC Count : 4.91 K/uL  Hemoglobin : 11.8 g/dL  Hematocrit : 36.7 %  Platelet Count - Automated : 162 K/uL  Mean Cell Volume : 95.1 fl  Mean Cell Hemoglobin : 30.6 pg  Mean Cell Hemoglobin Concentration : 32.2 gm/dL  Auto Neutrophil # : x  Auto Lymphocyte # : x  Auto Monocyte # : x  Auto Eosinophil # : x  Auto Basophil # : x  Auto Neutrophil % : x  Auto Lymphocyte % : x  Auto Monocyte % : x  Auto Eosinophil % : x  Auto Basophil % : x        140  |  113<H>  |  7   ----------------------------<  206<H>  4.3   |  21<L>  |  0.66    Ca    7.9<L>      22 Mar 2019 09:57  Phos  2.8       Mg     1.8         TPro  6.3  /  Alb  3.0<L>  /  TBili  0.5  /  DBili  x   /  AST  16  /  ALT  19  /  AlkPhos  77  -    PT/INR - ( 21 Mar 2019 21:35 )   PT: 11.1 sec;   INR: 1.00 ratio           Urinalysis Basic - ( 21 Mar 2019 18:27 )    Color: Yellow / Appearance: Clear / S.020 / pH: x  Gluc: x / Ketone: Large  / Bili: Negative / Urobili: Negative   Blood: x / Protein: Negative / Nitrite: Negative   Leuk Esterase: Negative / RBC: x / WBC x   Sq Epi: x / Non Sq Epi: x / Bacteria: x      Culture Results:   No growth ( @ 23:08)      RADIOLOGY & ADDITIONAL STUDIES REVIEWED:  ***    [ ]GOALS OF CARE DISCUSSION WITH PATIENT/FAMILY/PROXY:    CRITICAL CARE TIME SPENT: 35 minutes

## 2019-03-23 NOTE — PROGRESS NOTE ADULT - SUBJECTIVE AND OBJECTIVE BOX
INTERVAL HPI/OVERNIGHT EVENTS:       Antimicrobial:    Cardiovascular:    Pulmonary:    Hematalogic:  enoxaparin Injectable 40 milliGRAM(s) SubCutaneous daily    Other:  acetaminophen   Tablet .. 650 milliGRAM(s) Oral every 6 hours PRN  ergocalciferol 05175 Unit(s) Oral <User Schedule>  influenza   Vaccine 0.5 milliLiter(s) IntraMuscular once  insulin glargine Injectable (LANTUS) 8 Unit(s) SubCutaneous every morning  insulin lispro (HumaLOG) corrective regimen sliding scale   SubCutaneous three times a day before meals  insulin lispro (HumaLOG) corrective regimen sliding scale   SubCutaneous at bedtime  insulin lispro Injectable (HumaLOG) 10 Unit(s) SubCutaneous three times a day before meals  pantoprazole    Tablet 40 milliGRAM(s) Oral before breakfast      Drug Dosing Weight    Weight (kg): 59.5 (21 Mar 2019 21:07)    CENTRAL LINE: [ ] YES [ ] NO  LOCATION:   DATE INSERTED:    BEACH: [ ] YES [ ] NO    DATE INSERTED:    A-LINE:  [ ] YES [ ] NO  LOCATION:   DATE INSERTED:    PMH/Social Hx/[facility] Hx -reviewed admission note, no change since admission  PAST MEDICAL & SURGICAL HISTORY:  No pertinent past medical history      T(C): 36.8 (19 @ 12:00), Max: 36.8 (19 @ 16:13)  HR: 86 (19 @ 12:00)  BP: 117/63 (19 @ 12:00)  BP(mean): 75 (19 @ 12:00)  ABP: --  ABP(mean): --  RR: 9 (19 @ 12:00)  SpO2: 100% (19 @ 12:00)  Wt(kg): --           @ 07:01  -   @ 07:00  --------------------------------------------------------  IN: 1490 mL / OUT: 650 mL / NET: 840 mL            PHYSICAL EXAM:    GENERAL: No signs of distress, comfortable  HEAD: Atraumatic, Normocephalic  EYES: EOMI, PERRLA  ENMT: No erythema, exudates, or enlargement, Moist mucous membranes  NECK: Supple, normal appearance, No JVD; [  ] central line (if applicable)  CHEST/LUNG: No chest deformity, fair bilateral air entry; No rales, rhonchi, wheezing; crackles  HEART: Regular rate and rhythm; No murmurs, rubs, or gallops;   ABDOMEN: Soft, Nontender, Nondistended; Bowel sounds present  EXTREMITIES:  + Peripheral Pulses, No clubbing, cyanosis, or edema  NERVOUS SYSTEM: awake and alert x 3, follows commands, upper and lower extremities  LYMPH: No lymphadenopathy noted  SKIN: No rashes or lesions; good turgor, warm, dry      LABS:  CBC Full  -  ( 23 Mar 2019 06:29 )  WBC Count : 4.91 K/uL  Hemoglobin : 11.8 g/dL  Hematocrit : 36.7 %  Platelet Count - Automated : 162 K/uL  Mean Cell Volume : 95.1 fl  Mean Cell Hemoglobin : 30.6 pg  Mean Cell Hemoglobin Concentration : 32.2 gm/dL  Auto Neutrophil # : x  Auto Lymphocyte # : x  Auto Monocyte # : x  Auto Eosinophil # : x  Auto Basophil # : x  Auto Neutrophil % : x  Auto Lymphocyte % : x  Auto Monocyte % : x  Auto Eosinophil % : x  Auto Basophil % : x        140  |  109<H>  |  8   ----------------------------<  324<H>  4.1   |  22  |  0.60    Ca    7.9<L>      23 Mar 2019 06:29  Phos  3.1       Mg     1.7         TPro  6.3  /  Alb  3.0<L>  /  TBili  0.5  /  DBili  x   /  AST  16  /  ALT  19  /  AlkPhos  77  03-    PT/INR - ( 21 Mar 2019 21:35 )   PT: 11.1 sec;   INR: 1.00 ratio           Urinalysis Basic - ( 21 Mar 2019 18:27 )    Color: Yellow / Appearance: Clear / S.020 / pH: x  Gluc: x / Ketone: Large  / Bili: Negative / Urobili: Negative   Blood: x / Protein: Negative / Nitrite: Negative   Leuk Esterase: Negative / RBC: x / WBC x   Sq Epi: x / Non Sq Epi: x / Bacteria: x      Culture Results:   No growth (03-21 @ 23:08)      RADIOLOGY & ADDITIONAL STUDIES REVIEWED         IMPRESSION:  PAST MEDICAL & SURGICAL HISTORY:  No pertinent past medical history   p/w     IMP:43 F with history of DM type 1 diagnosed at 7 year old  from home presented with nausea and vomiting for 1 days and found to have BS >500 at home. Patient was on insulin pump since she was diagnosed at 7 and cut off 2 years ago for insurance and started on Basaglar 10 U at night and Humalog 10U mealtime/TID and compliance with medication and diet. Her BS range from 40 to 200s, did not have severe hyperglycemic attack since she was diagnosed. Pt also endorsed some discomfort in epigastric and LUQ area. Denied recent flu, diarrhea, fever, chills, dysuria and any other symptoms.   In ED,  vitals were stable. , HCO2 12, AG 19, Acetone large, pH 7.16 on presentation. pt receive 2L NS and 15U Humalog  Pat admitted to icu for DKA, severe metabolic acidosis and gastroenteritis. DKA resolved but blood sugar is very high. Will restart lantus          Plan:      CNS: none    PULMONARY: o2 supp    CARDIAC: monitor    GI: npo. ivf    RENAL: monitor eletrolytes    SKIN: no issue    MUSCULOSKELETAL:boots    ID: no issue    HEME: monitor    ENDO: resume lantus.    DVT and GI Prophylaxis    GOALS OF CARE DISCUSSION WITH PATIENT/FAMILY/PROXY/ icu team on rounds    CRITICAL CARE TIME SPENT: 35 minutes

## 2019-03-23 NOTE — DIETITIAN INITIAL EVALUATION ADULT. - PERTINENT LABORATORY DATA
Reviewed. 03-23 Na140 mmol/L Glu 324 mg/dL<H> K+ 4.1 mmol/L Cr  0.60 mg/dL BUN 8 mg/dL 03-23 Phos 3.1 mg/dL 03-22 Alb 3.0 g/dL<L> 03-21 Chol 151 mg/dL LDL 93 mg/dL HDL 36 mg/dL<L> Trig 110 mg/dL

## 2019-03-23 NOTE — PROGRESS NOTE ADULT - ASSESSMENT
43 F with DM 1 presented with DKA with .     1)DKA  --->40s-->109, HCO2 12, AG 19-->9, Acetone large, pH 7.16-->7.30  -s/p 3L NS in ED  -K 4.3  -Hco3 12-->18  -Diet advanced  -lantus 8 U   -Dr. Aldridge consulted     2)Metabolic acidosis from DKA  IV hydration and repeat VBG Q4 with lab for now until pH normalize    3)DVT and GI PPx  Protonix and Levonox
43 F with DM 1 presented with DKA with .     1)DKA  --->40s-->109, HCO2 12, AG 19-->9, Acetone large, pH 7.16-->7.30  -s/p 3L NS in ED  -K 4.3  -Hco3 12-->18  -Diet advanced  -lantus 8 U and HSS  -Dr. Luis Carlos tanner     2)Metabolic acidosis from DKA  pH improved  lactate normalizes    3)DVT and GI PPx  Protonix and Levonox

## 2019-03-23 NOTE — DIETITIAN INITIAL EVALUATION ADULT. - OTHER INFO
Patient visited for [facility] ICU. Patient admitted with type 1 DM with ketoacidosis without coma. Patient was diagnosed with Type 1 DM at 7 years old. Patient stated she is aware of carbohydrate consumption, denied education. Patient denied any chewing or swallowing difficulty and abdominal pain. Patient stated that she has always had a low appetite. Patient reported that she usually consumes small meals. Patient reported weight loss in the last two months of 6 pounds. Patient currently on consistent carbohydrate diet with evening snacks. Patient stated she is tolerating it well but just not hungry and eats small portion of it. Patient presents with no edema. Skin intact.

## 2019-03-24 ENCOUNTER — TRANSCRIPTION ENCOUNTER (OUTPATIENT)
Age: 44
End: 2019-03-24

## 2019-03-24 LAB
ANION GAP SERPL CALC-SCNC: 7 MMOL/L — SIGNIFICANT CHANGE UP (ref 5–17)
BASOPHILS # BLD AUTO: 0.03 K/UL — SIGNIFICANT CHANGE UP (ref 0–0.2)
BASOPHILS NFR BLD AUTO: 0.6 % — SIGNIFICANT CHANGE UP (ref 0–2)
BUN SERPL-MCNC: 18 MG/DL — SIGNIFICANT CHANGE UP (ref 7–18)
CALCIUM SERPL-MCNC: 7.6 MG/DL — LOW (ref 8.4–10.5)
CHLORIDE SERPL-SCNC: 106 MMOL/L — SIGNIFICANT CHANGE UP (ref 96–108)
CO2 SERPL-SCNC: 23 MMOL/L — SIGNIFICANT CHANGE UP (ref 22–31)
CREAT SERPL-MCNC: 0.66 MG/DL — SIGNIFICANT CHANGE UP (ref 0.5–1.3)
EOSINOPHIL # BLD AUTO: 0.07 K/UL — SIGNIFICANT CHANGE UP (ref 0–0.5)
EOSINOPHIL NFR BLD AUTO: 1.5 % — SIGNIFICANT CHANGE UP (ref 0–6)
GLUCOSE BLDC GLUCOMTR-MCNC: 107 MG/DL — HIGH (ref 70–99)
GLUCOSE BLDC GLUCOMTR-MCNC: 150 MG/DL — HIGH (ref 70–99)
GLUCOSE BLDC GLUCOMTR-MCNC: 209 MG/DL — HIGH (ref 70–99)
GLUCOSE BLDC GLUCOMTR-MCNC: 54 MG/DL — LOW (ref 70–99)
GLUCOSE BLDC GLUCOMTR-MCNC: 56 MG/DL — LOW (ref 70–99)
GLUCOSE BLDC GLUCOMTR-MCNC: 67 MG/DL — LOW (ref 70–99)
GLUCOSE BLDC GLUCOMTR-MCNC: 72 MG/DL — SIGNIFICANT CHANGE UP (ref 70–99)
GLUCOSE BLDC GLUCOMTR-MCNC: 83 MG/DL — SIGNIFICANT CHANGE UP (ref 70–99)
GLUCOSE SERPL-MCNC: 401 MG/DL — HIGH (ref 70–99)
HBA1C BLD-MCNC: 10.8 % — HIGH (ref 4–5.6)
HCT VFR BLD CALC: 35.6 % — SIGNIFICANT CHANGE UP (ref 34.5–45)
HGB BLD-MCNC: 11.5 G/DL — SIGNIFICANT CHANGE UP (ref 11.5–15.5)
IMM GRANULOCYTES NFR BLD AUTO: 0.4 % — SIGNIFICANT CHANGE UP (ref 0–1.5)
LYMPHOCYTES # BLD AUTO: 1.28 K/UL — SIGNIFICANT CHANGE UP (ref 1–3.3)
LYMPHOCYTES # BLD AUTO: 26.7 % — SIGNIFICANT CHANGE UP (ref 13–44)
MAGNESIUM SERPL-MCNC: 1.7 MG/DL — SIGNIFICANT CHANGE UP (ref 1.6–2.6)
MCHC RBC-ENTMCNC: 31.1 PG — SIGNIFICANT CHANGE UP (ref 27–34)
MCHC RBC-ENTMCNC: 32.3 GM/DL — SIGNIFICANT CHANGE UP (ref 32–36)
MCV RBC AUTO: 96.2 FL — SIGNIFICANT CHANGE UP (ref 80–100)
MONOCYTES # BLD AUTO: 0.37 K/UL — SIGNIFICANT CHANGE UP (ref 0–0.9)
MONOCYTES NFR BLD AUTO: 7.7 % — SIGNIFICANT CHANGE UP (ref 2–14)
NEUTROPHILS # BLD AUTO: 3.02 K/UL — SIGNIFICANT CHANGE UP (ref 1.8–7.4)
NEUTROPHILS NFR BLD AUTO: 63.1 % — SIGNIFICANT CHANGE UP (ref 43–77)
NRBC # BLD: 0 /100 WBCS — SIGNIFICANT CHANGE UP (ref 0–0)
PHOSPHATE SERPL-MCNC: 3.9 MG/DL — SIGNIFICANT CHANGE UP (ref 2.5–4.5)
PLATELET # BLD AUTO: 179 K/UL — SIGNIFICANT CHANGE UP (ref 150–400)
POTASSIUM SERPL-MCNC: 5 MMOL/L — SIGNIFICANT CHANGE UP (ref 3.5–5.3)
POTASSIUM SERPL-SCNC: 5 MMOL/L — SIGNIFICANT CHANGE UP (ref 3.5–5.3)
RBC # BLD: 3.7 M/UL — LOW (ref 3.8–5.2)
RBC # FLD: 12.8 % — SIGNIFICANT CHANGE UP (ref 10.3–14.5)
SODIUM SERPL-SCNC: 136 MMOL/L — SIGNIFICANT CHANGE UP (ref 135–145)
WBC # BLD: 4.79 K/UL — SIGNIFICANT CHANGE UP (ref 3.8–10.5)
WBC # FLD AUTO: 4.79 K/UL — SIGNIFICANT CHANGE UP (ref 3.8–10.5)

## 2019-03-24 RX ORDER — INSULIN HUMAN 100 [IU]/ML
10 INJECTION, SOLUTION SUBCUTANEOUS ONCE
Qty: 0 | Refills: 0 | Status: COMPLETED | OUTPATIENT
Start: 2019-03-24 | End: 2019-03-24

## 2019-03-24 RX ADMIN — INSULIN GLARGINE 8 UNIT(S): 100 INJECTION, SOLUTION SUBCUTANEOUS at 07:46

## 2019-03-24 RX ADMIN — ENOXAPARIN SODIUM 40 MILLIGRAM(S): 100 INJECTION SUBCUTANEOUS at 12:00

## 2019-03-24 RX ADMIN — PANTOPRAZOLE SODIUM 40 MILLIGRAM(S): 20 TABLET, DELAYED RELEASE ORAL at 06:58

## 2019-03-24 RX ADMIN — Medication 2: at 06:55

## 2019-03-24 RX ADMIN — INSULIN HUMAN 10 UNIT(S): 100 INJECTION, SOLUTION SUBCUTANEOUS at 04:55

## 2019-03-24 RX ADMIN — Medication 10 UNIT(S): at 17:08

## 2019-03-24 RX ADMIN — Medication 2: at 17:08

## 2019-03-24 NOTE — DISCHARGE NOTE PROVIDER - PROVIDER TOKENS
FREE:[LAST:[sender],FIRST:[kristan],PHONE:[(   )    -],FAX:[(   )    -],FOLLOWUP:[1 week]] PROVIDER:[TOKEN:[[id number]],FOLLOWUP:[1 week]],FREE:[LAST:[sender],FIRST:[kristan],PHONE:[(   )    -],FAX:[(   )    -],FOLLOWUP:[1 week]] PROVIDER:[TOKEN:[[id number]],FOLLOWUP:[1 week]],FREE:[LAST:[sender],FIRST:[kristan],PHONE:[(   )    -],FAX:[(   )    -],FOLLOWUP:[1 week]],PROVIDER:[TOKEN:[[id number]],FOLLOWUP:[1 week]]

## 2019-03-24 NOTE — DISCHARGE NOTE PROVIDER - CARE PROVIDER_API CALL
sender, kristan  Phone: (   )    -  Fax: (   )    -  Follow Up Time: 1 week Pita Aldridge)  EndocrinologyMetabDiabetes  [address] [place] [address]  Phone: [phone]  Fax: [phone]  Follow Up Time: 1 week    senderkristan  Phone: (   )    -  Fax: (   )    -  Follow Up Time: 1 week Pita Aldridge)  Endocrinology[facility]abDiabetes  [address] [place] [address]  Phone: [phone]  Fax: [phone]  Follow Up Time: 1 week    sender, kristan  Phone: (   )    -  Fax: (   )    -  Follow Up Time: 1 week    Siddhartha Dalton)  Internal Medicine  [address] [place] [address]  Phone: [phone]  Fax: [phone]  Follow Up Time: 1 week

## 2019-03-24 NOTE — DISCHARGE NOTE PROVIDER - HOSPITAL COURSE
43 F with history of DM type 1 diagnosed at 7 year old  from home presented with nausea and vomiting for 1 days and found to have BS >500 at home. Patient was on insulin pump since she was diagnosed at 7 and cut off 2 years ago for insurance and started on Basaglar 10 U at night and Humalog 10U mealtime/TID and compliance with medication and diet. Her BS range from 40 to 200s, did not have severe hyperglycemic attack since she was diagnosed. Pt also endorsed some discomfort in epigastric and LUQ area. Denied recent flu, diarrhea, fever, chills, dysuria and any other symptoms.     In ED,  vitals were stable. , HCO2 12, AG 19, Acetone large, pH 7.16 on presentation. pt receive 2L NS and 15U Humalog.     Admitted to ICU for DKA.     -DKA Likely from non compliance and self management( used to be on pump , now on B-B , self manages Diabetes).     -BS labile 43 F with history of DM type 1 diagnosed at 7 year old  from home presented with nausea and vomiting for 1 days and found to have BS >500 at home. Patient was on insulin pump since she was diagnosed at 7 and cut off 2 years ago for insurance and started on Basaglar 10 U at night and Humalog 10U mealtime/TID and compliance with medication and diet. Her BS range from 40 to 200s, did not have severe hyperglycemic attack since she was diagnosed. Pt also endorsed some discomfort in epigastric and LUQ area. Denied recent flu, diarrhea, fever, chills, dysuria and any other symptoms.     In ED,  vitals were stable. , HCO2 12, AG 19, Acetone large, pH 7.16 on presentation. pt receive 2L NS and 15U Humalog.     Admitted to ICU for DKA.     -DKA Likely from non compliance and self management( used to be on pump , now on B-B , self manages Diabetes). pt. was last seen by marlee givens 2-3 yrs ago when her HgnA1C was 7.4, currently HgnA1C 10.8.   Pt. reports having been on Insulin pump until approx 2 yrs ago when she lost her insurance, she now has new insurance with [employer].  Pt. was seen and evaluated by chon Pandey and will follow up with Dr. pandey to obtain Insulin pump again.     -BS labile on current regimen, will discharge on current regimen and adjust as outpt. 43 F with history of DM type 1 diagnosed at 7 year old  from home presented with nausea and vomiting for 1 days and found to have BS >500 at home. Patient was on insulin pump since she was diagnosed at 7 and cut off 2 years ago for insurance and started on Basaglar 10 U at night and Humalog 10U mealtime/TID and compliance with medication and diet. Her BS range from 40 to 200s, did not have severe hyperglycemic attack since she was diagnosed. Pt also endorsed some discomfort in epigastric and LUQ area. Denied recent flu, diarrhea, fever, chills, dysuria and any other symptoms.     In ED,  vitals were stable. , HCO2 12, AG 19, Acetone large, pH 7.16 on presentation. pt receive 2L NS and 15U Humalog.     Admitted to ICU for DKA.     -DKA Likely from non compliance and self management( used to be on pump , now on B-B , self manages Diabetes). pt. was last seen by outmarkus givens 2-3 yrs ago when her HgnA1C was 7.4, currently HgnA1C 10.8.   Pt. reports having been on Insulin pump until approx 2 yrs ago when she lost her insurance, she now has new insurance with [employer].  Pt. was seen and evaluated by endo Dr. Pandey and will follow up with Dr. pandey to obtain Insulin pump again.     -BS labile on current regimen, will discharge on current regimen and adjust as outpt.        Discussed with attending, pt. is medically stable for discharge to home.

## 2019-03-24 NOTE — PROGRESS NOTE ADULT - SUBJECTIVE AND OBJECTIVE BOX
43 F with history of DM type 1 diagnosed at 7 year old  from home presented with nausea and vomiting for 1 days and found to have BS >500 at home. Patient was on insulin pump since she was diagnosed at 7 and cut off 2 years ago for insurance and started on Basaglar 10 U at night and Humalog 10U mealtime/TID and compliance with medication and diet. Her BS range from 40 to 200s, did not have severe hyperglycemic attack since she was diagnosed. Pt also endorsed some discomfort in epigastric and LUQ area. Denied recent flu, diarrhea, fever, chills, dysuria and any other symptoms.   In ED,  vitals were stable. , HCO2 12, AG 19, Acetone large, pH 7.16 on presentation. pt receive 2L NS and 15U Humalog     Review of Systems:  Review of Systems: REVIEW OF SYSTEMS:   CONSTITUTIONAL: No weakness, fevers or chills  EYES/ENT: No visual changes;  No vertigo or throat pain   NECK: No pain or stiffness  RESPIRATORY: No cough, wheezing, hemoptysis; No shortness of breath  CARDIOVASCULAR: No chest pain or palpitations  GASTROINTESTINAL: mild epigastric pain with nausea, vomiting, NO hematemesis; No diarrhea or constipation. No melena or hematochezia.  GENITOURINARY: No dysuria, frequency or hematuria  NEUROLOGICAL: No numbness or weakness  SKIN: No itching, rashes No other complaint except mentioned as above.	      pt seen in icu [ x ], reg med floor [   ], bed [  ], chair at bedside [ x  ], a+o x3 [ x ], lethargic [  ],  nad [ x ]        Allergies    penicillin (Hives)  penicillin (Rash)        Vitals    T(F): 97.7 (03-24-19 @ 04:26), Max: 98.4 (03-23-19 @ 20:00)  HR: 69 (03-24-19 @ 07:00) (69 - 96)  BP: 119/68 (03-24-19 @ 07:00) (101/54 - 154/73)  RR: 11 (03-24-19 @ 07:00) (9 - 20)  SpO2: 99% (03-24-19 @ 07:00) (95% - 100%)  Wt(kg): --  CAPILLARY BLOOD GLUCOSE      POCT Blood Glucose.: 217 mg/dL (24 Mar 2019 06:49)      Labs                          11.5   4.79  )-----------( 179      ( 24 Mar 2019 04:37 )             35.6       03-24    136  |  106  |  18  ----------------------------<  401<H>  5.0   |  23  |  0.66    Ca    7.6<L>      24 Mar 2019 04:37  Phos  3.9     03-24  Mg     1.7     03-24              .Urine  03-21 @ 23:08   No growth  --  --          Radiology Results      Meds    MEDICATIONS  (STANDING):  enoxaparin Injectable 40 milliGRAM(s) SubCutaneous daily  ergocalciferol 48929 Unit(s) Oral <User Schedule>  influenza   Vaccine 0.5 milliLiter(s) IntraMuscular once  insulin glargine Injectable (LANTUS) 8 Unit(s) SubCutaneous every morning  insulin lispro (HumaLOG) corrective regimen sliding scale   SubCutaneous three times a day before meals  insulin lispro (HumaLOG) corrective regimen sliding scale   SubCutaneous at bedtime  insulin lispro Injectable (HumaLOG) 10 Unit(s) SubCutaneous three times a day before meals  pantoprazole    Tablet 40 milliGRAM(s) Oral before breakfast      MEDICATIONS  (PRN):  acetaminophen   Tablet .. 650 milliGRAM(s) Oral every 6 hours PRN Mild Pain (1 - 3)      Physical Exam    Neuro :  no focal deficits  Respiratory: CTA B/L  CV: RRR, S1S2, no murmurs,   Abdominal: Soft, NT, ND +BS,  Extremities: No edema, + peripheral pulses    ASSESSMENT    Type 1 diabetes mellitus with ketoacidosis without coma  vit d defficiency      PLAN      endo cons  f/u [id number]  cont lantus and humalog  cont current meds

## 2019-03-24 NOTE — DISCHARGE NOTE PROVIDER - NSDCCPCAREPLAN_GEN_ALL_CORE_FT
PRINCIPAL DISCHARGE DIAGNOSIS  Diagnosis: Type 1 diabetes mellitus with ketoacidosis without coma  Assessment and Plan of Treatment: HgA1C this admission. 10.8  Make sure you get your HgA1c checked every three months.  If you take oral diabetes medications, check your blood glucose two times a day.  If you take insulin, check your blood glucose before meals and at bedtime.  It's important not to skip any meals.  Keep a log of your blood glucose results and always take it with you to your doctor appointments.  Keep a list of your current medications including injectables and over the counter medications and bring this medication list with you to all your doctor appointments.  If you have not seen your ophthalmologist this year call for appointment.  Check your feet daily for redness, sores, or openings. Do not self treat. If no improvement in two days call your primary care physician for an appointment.  Low blood sugar (hypoglycemia) is a blood sugar below 70mg/dl. Check your blood sugar if you feel signs/symptoms of hypoglycemia. If your blood sugar is below 70 take 15 grams of carbohydrates (ex 4 oz of apple juice, 3-4 glucose tablets, or 4-6 oz of regular soda) wait 15 minutes and repeat blood sugar to make sure it comes up above 70.  If your blood sugar is above 70 and you are due for a meal, have a meal.  If you are not due for a meal have a snack.  This snack helps keeps your blood sugar at a safe range. PRINCIPAL DISCHARGE DIAGNOSIS  Diagnosis: Type 1 diabetes mellitus with ketoacidosis without coma  Assessment and Plan of Treatment: You were admitted with extremely high blood glucose levels and symptoms associated.  You were stabalized in ICU with Insulin drip and were monitored closely on medical floor.  Your HgA1C this admission. 10.8 which is high.  You were evaluated by endocrinologist Dr. Aldridge and agreed to follow up with her as an outpt. with planned future pump.  Please continue Lantus and Humalog as ordered and follow up with Dr. Aldridge.  It's important not to skip any meals.  Keep a log of your blood glucose results and always take it with you to your doctor appointments.  Keep a list of your current medications including injectables and over the counter medications and bring this medication list with you to all your doctor appointments.  If you have not seen your ophthalmologist this year call for appointment.  Check your feet daily for redness, sores, or openings. Do not self treat. If no improvement in two days call your primary care physician for an appointment.  Low blood sugar (hypoglycemia) is a blood sugar below 70mg/dl. Check your blood sugar if you feel signs/symptoms of hypoglycemia. If your blood sugar is below 70 take 15 grams of carbohydrates (ex 4 oz of apple juice, 3-4 glucose tablets, or 4-6 oz of regular soda) wait 15 minutes and repeat blood sugar to make sure it comes up above 70.  If your blood sugar is above 70 and you are due for a meal, have a meal.  If you are not due for a meal have a snack.  This snack helps keeps your blood sugar at a safe range.        SECONDARY DISCHARGE DIAGNOSES  Diagnosis: Mild vitamin D deficiency  Assessment and Plan of Treatment: Your Vitamin D level was low 13.7 (30-80.  Please take vitamin D supplement as ordered and optimize your nutritional status.  Please follow up your Vitamin D level in 3 months.

## 2019-03-24 NOTE — CHART NOTE - NSCHARTNOTEFT_GEN_A_CORE
43 F with history of DM type 1 diagnosed at 7 year old  from home presented with nausea and vomiting for 1 days and found to have BS >500 at home. Patient was on insulin pump since she was diagnosed at 7 and cut off 2 years ago for insurance and started on Basaglar 10 U at night and Humalog 10U mealtime/TID and compliance with medication and diet. Her BS range from 40 to 200s, did not have severe hyperglycemic attack since she was diagnosed. Pt also endorsed some discomfort in epigastric and LUQ area. Denied recent flu, diarrhea, fever, chills, dysuria and any other symptoms.   In ED,  vitals were stable. , HCO2 12, AG 19, Acetone large, pH 7.16 on presentation. pt receive 2L NS and 15U Humalog.   Admitted to ICU for DKA.       1)DKA  - BS improved   - AG 19-->9  - pH 7.16-->7.30  - K 4.3  - Hco3 12-->18  - Was on insulin drip, bridged with Lantus and HSS  - Diet advanced  - lantus 8 U , humalog 8 TID and HSS  - Dr. Aldridge consulted     2)Metabolic acidosis from DKA  pH improved  lactate normalized    3)DVT and GI PPx  Protonix and Levonox    Things to follow:  1. Monitor accuchecks, adjust insulin , f/u endo    Stable to be downgraded to medicine floor under Dr. Dalton. 43 F with history of DM type 1 diagnosed at 7 year old  from home presented with nausea and vomiting for 1 days and found to have BS >500 at home. Patient was on insulin pump since she was diagnosed at 7 and cut off 2 years ago for insurance and started on Basaglar 10 U at night and Humalog 10U mealtime/TID and compliance with medication and diet. Her BS range from 40 to 200s, did not have severe hyperglycemic attack since she was diagnosed. Pt also endorsed some discomfort in epigastric and LUQ area. Denied recent flu, diarrhea, fever, chills, dysuria and any other symptoms.   In ED,  vitals were stable. , HCO2 12, AG 19, Acetone large, pH 7.16 on presentation. pt receive 2L NS and 15U Humalog.   Admitted to ICU for DKA.       1)DKA  - Likely from non compliance and self management( used to be on pump , now on B-B , self manages Diabetes)  - BS improved   - AG 19-->9  - pH 7.16-->7.30  - K 4.3  - Hco3 12-->18  - Was on insulin drip, bridged with Lantus and HSS  - Diet advanced  - lantus 8 U , humalog 8 TID and HSS  - Dr. Aldridge consulted     2)Metabolic acidosis from DKA  pH improved  lactate normalized    3)DVT and GI PPx  Protonix and Levonox    Things to follow:  1. Monitor accuchecks, adjust insulin , f/u endo , diabetic education  2. Social work consult to resolve any insurance issues    Stable to be downgraded to medicine floor under Dr. Dalton.

## 2019-03-25 ENCOUNTER — TRANSCRIPTION ENCOUNTER (OUTPATIENT)
Age: 44
End: 2019-03-25

## 2019-03-25 VITALS
HEART RATE: 75 BPM | SYSTOLIC BLOOD PRESSURE: 107 MMHG | RESPIRATION RATE: 15 BRPM | TEMPERATURE: 99 F | OXYGEN SATURATION: 100 % | DIASTOLIC BLOOD PRESSURE: 70 MMHG

## 2019-03-25 DIAGNOSIS — E10.10 TYPE 1 DIABETES MELLITUS WITH KETOACIDOSIS WITHOUT COMA: ICD-10-CM

## 2019-03-25 LAB
ALBUMIN SERPL ELPH-MCNC: 2.8 G/DL — LOW (ref 3.5–5)
ALP SERPL-CCNC: 86 U/L — SIGNIFICANT CHANGE UP (ref 40–120)
ALT FLD-CCNC: 21 U/L DA — SIGNIFICANT CHANGE UP (ref 10–60)
ANION GAP SERPL CALC-SCNC: 6 MMOL/L — SIGNIFICANT CHANGE UP (ref 5–17)
AST SERPL-CCNC: 22 U/L — SIGNIFICANT CHANGE UP (ref 10–40)
BILIRUB SERPL-MCNC: 0.5 MG/DL — SIGNIFICANT CHANGE UP (ref 0.2–1.2)
BUN SERPL-MCNC: 19 MG/DL — HIGH (ref 7–18)
CALCIUM SERPL-MCNC: 8.2 MG/DL — LOW (ref 8.4–10.5)
CHLORIDE SERPL-SCNC: 104 MMOL/L — SIGNIFICANT CHANGE UP (ref 96–108)
CO2 SERPL-SCNC: 27 MMOL/L — SIGNIFICANT CHANGE UP (ref 22–31)
CREAT SERPL-MCNC: 0.61 MG/DL — SIGNIFICANT CHANGE UP (ref 0.5–1.3)
GLUCOSE BLDC GLUCOMTR-MCNC: 143 MG/DL — HIGH (ref 70–99)
GLUCOSE BLDC GLUCOMTR-MCNC: 303 MG/DL — HIGH (ref 70–99)
GLUCOSE BLDC GLUCOMTR-MCNC: 308 MG/DL — HIGH (ref 70–99)
GLUCOSE BLDC GLUCOMTR-MCNC: 356 MG/DL — HIGH (ref 70–99)
GLUCOSE SERPL-MCNC: 370 MG/DL — HIGH (ref 70–99)
HCT VFR BLD CALC: 36.5 % — SIGNIFICANT CHANGE UP (ref 34.5–45)
HGB BLD-MCNC: 12 G/DL — SIGNIFICANT CHANGE UP (ref 11.5–15.5)
MCHC RBC-ENTMCNC: 30.9 PG — SIGNIFICANT CHANGE UP (ref 27–34)
MCHC RBC-ENTMCNC: 32.9 GM/DL — SIGNIFICANT CHANGE UP (ref 32–36)
MCV RBC AUTO: 94.1 FL — SIGNIFICANT CHANGE UP (ref 80–100)
NRBC # BLD: 0 /100 WBCS — SIGNIFICANT CHANGE UP (ref 0–0)
PLATELET # BLD AUTO: 142 K/UL — LOW (ref 150–400)
POTASSIUM SERPL-MCNC: 4.2 MMOL/L — SIGNIFICANT CHANGE UP (ref 3.5–5.3)
POTASSIUM SERPL-SCNC: 4.2 MMOL/L — SIGNIFICANT CHANGE UP (ref 3.5–5.3)
PROT SERPL-MCNC: 6.2 G/DL — SIGNIFICANT CHANGE UP (ref 6–8.3)
RBC # BLD: 3.88 M/UL — SIGNIFICANT CHANGE UP (ref 3.8–5.2)
RBC # FLD: 12.9 % — SIGNIFICANT CHANGE UP (ref 10.3–14.5)
SODIUM SERPL-SCNC: 137 MMOL/L — SIGNIFICANT CHANGE UP (ref 135–145)
WBC # BLD: 5.58 K/UL — SIGNIFICANT CHANGE UP (ref 3.8–10.5)
WBC # FLD AUTO: 5.58 K/UL — SIGNIFICANT CHANGE UP (ref 3.8–10.5)

## 2019-03-25 PROCEDURE — 82803 BLOOD GASES ANY COMBINATION: CPT

## 2019-03-25 PROCEDURE — 83690 ASSAY OF LIPASE: CPT

## 2019-03-25 PROCEDURE — 96374 THER/PROPH/DIAG INJ IV PUSH: CPT

## 2019-03-25 PROCEDURE — 80053 COMPREHEN METABOLIC PANEL: CPT

## 2019-03-25 PROCEDURE — 96372 THER/PROPH/DIAG INJ SC/IM: CPT | Mod: XU

## 2019-03-25 PROCEDURE — 93005 ELECTROCARDIOGRAM TRACING: CPT

## 2019-03-25 PROCEDURE — 82962 GLUCOSE BLOOD TEST: CPT

## 2019-03-25 PROCEDURE — 82746 ASSAY OF FOLIC ACID SERUM: CPT

## 2019-03-25 PROCEDURE — 80048 BASIC METABOLIC PNL TOTAL CA: CPT

## 2019-03-25 PROCEDURE — 82607 VITAMIN B-12: CPT

## 2019-03-25 PROCEDURE — 71046 X-RAY EXAM CHEST 2 VIEWS: CPT

## 2019-03-25 PROCEDURE — 85027 COMPLETE CBC AUTOMATED: CPT

## 2019-03-25 PROCEDURE — 82009 KETONE BODYS QUAL: CPT

## 2019-03-25 PROCEDURE — 87631 RESP VIRUS 3-5 TARGETS: CPT

## 2019-03-25 PROCEDURE — 36415 COLL VENOUS BLD VENIPUNCTURE: CPT

## 2019-03-25 PROCEDURE — 82040 ASSAY OF SERUM ALBUMIN: CPT

## 2019-03-25 PROCEDURE — 82306 VITAMIN D 25 HYDROXY: CPT

## 2019-03-25 PROCEDURE — 83735 ASSAY OF MAGNESIUM: CPT

## 2019-03-25 PROCEDURE — 99285 EMERGENCY DEPT VISIT HI MDM: CPT | Mod: 25

## 2019-03-25 PROCEDURE — 80061 LIPID PANEL: CPT

## 2019-03-25 PROCEDURE — 84443 ASSAY THYROID STIM HORMONE: CPT

## 2019-03-25 PROCEDURE — 81003 URINALYSIS AUTO W/O SCOPE: CPT

## 2019-03-25 PROCEDURE — 84100 ASSAY OF PHOSPHORUS: CPT

## 2019-03-25 PROCEDURE — 83605 ASSAY OF LACTIC ACID: CPT

## 2019-03-25 PROCEDURE — 87086 URINE CULTURE/COLONY COUNT: CPT

## 2019-03-25 PROCEDURE — 84702 CHORIONIC GONADOTROPIN TEST: CPT

## 2019-03-25 PROCEDURE — 83036 HEMOGLOBIN GLYCOSYLATED A1C: CPT

## 2019-03-25 PROCEDURE — 85610 PROTHROMBIN TIME: CPT

## 2019-03-25 RX ORDER — INSULIN LISPRO 100/ML
6 VIAL (ML) SUBCUTANEOUS
Qty: 6 | Refills: 0 | OUTPATIENT
Start: 2019-03-25 | End: 2019-04-23

## 2019-03-25 RX ORDER — ERGOCALCIFEROL 1.25 MG/1
1 CAPSULE ORAL
Qty: 4 | Refills: 0
Start: 2019-03-25 | End: 2019-04-23

## 2019-03-25 RX ORDER — INSULIN LISPRO 100/ML
6 VIAL (ML) SUBCUTANEOUS
Qty: 3 | Refills: 0
Start: 2019-03-25 | End: 2019-04-23

## 2019-03-25 RX ORDER — INSULIN LISPRO 100/ML
10 VIAL (ML) SUBCUTANEOUS
Qty: 0 | Refills: 0 | COMMUNITY

## 2019-03-25 RX ORDER — INSULIN GLARGINE 100 [IU]/ML
10 INJECTION, SOLUTION SUBCUTANEOUS
Qty: 3 | Refills: 0
Start: 2019-03-25 | End: 2019-04-23

## 2019-03-25 RX ORDER — INSULIN GLARGINE 100 [IU]/ML
10 INJECTION, SOLUTION SUBCUTANEOUS
Qty: 0 | Refills: 0 | COMMUNITY

## 2019-03-25 RX ADMIN — Medication 10 UNIT(S): at 08:04

## 2019-03-25 RX ADMIN — Medication 10 UNIT(S): at 12:20

## 2019-03-25 RX ADMIN — Medication 5: at 08:04

## 2019-03-25 RX ADMIN — ENOXAPARIN SODIUM 40 MILLIGRAM(S): 100 INJECTION SUBCUTANEOUS at 12:21

## 2019-03-25 RX ADMIN — PANTOPRAZOLE SODIUM 40 MILLIGRAM(S): 20 TABLET, DELAYED RELEASE ORAL at 06:04

## 2019-03-25 RX ADMIN — INSULIN GLARGINE 8 UNIT(S): 100 INJECTION, SOLUTION SUBCUTANEOUS at 08:04

## 2019-03-25 NOTE — CONSULT NOTE ADULT - ASSESSMENT
43 F with history of DM type 1 diagnosed at 7 year old  from home presented with nausea and vomiting for 1 days and found to have BS >500 at home.  Found to be in DKA. S/p iv insulin. Pt admits to compliance with diet and insulin. No recent md f/u as busy with work.

## 2019-03-25 NOTE — PROGRESS NOTE ADULT - SUBJECTIVE AND OBJECTIVE BOX
:::::ACUTE CARE FOLLOW UP:::::    INTERVAL HPI/OVERNIGHT EVENTS:       Antimicrobial:    Cardiovascular:    Pulmonary:    Hematalogic:  enoxaparin Injectable 40 milliGRAM(s) SubCutaneous daily    Other:  acetaminophen   Tablet .. 650 milliGRAM(s) Oral every 6 hours PRN  ergocalciferol 83536 Unit(s) Oral <User Schedule>  influenza   Vaccine 0.5 milliLiter(s) IntraMuscular once  insulin glargine Injectable (LANTUS) 8 Unit(s) SubCutaneous every morning  insulin lispro (HumaLOG) corrective regimen sliding scale   SubCutaneous three times a day before meals  insulin lispro (HumaLOG) corrective regimen sliding scale   SubCutaneous at bedtime  insulin lispro Injectable (HumaLOG) 10 Unit(s) SubCutaneous three times a day before meals  pantoprazole    Tablet 40 milliGRAM(s) Oral before breakfast      Drug Dosing Weight    Weight (kg): 59.5 (21 Mar 2019 21:07)    CENTRAL LINE: [ ] YES [ ] NO  LOCATION:   DATE INSERTED:    BEACH: [ ] YES [ ] NO    DATE INSERTED:      PMH/Social Hx/Fam Hx -reviewed admission note, no change since admission  PAST MEDICAL & SURGICAL HISTORY:  No pertinent past medical history      T(C): 37 (03-25-19 @ 14:19), Max: 37 (03-25-19 @ 14:19)  HR: 75 (03-25-19 @ 14:19)  BP: 107/70 (03-25-19 @ 14:19)  BP(mean): --  ABP: --  ABP(mean): --  RR: 15 (03-25-19 @ 14:19)  SpO2: 100% (03-25-19 @ 14:19)  Wt(kg): --                PHYSICAL EXAM:    GENERAL: No signs of distress, comfortable  HEAD: Atraumatic, Normocephalic  EYES: EOMI, PERRLA  ENMT: No erythema, exudates, or enlargement, Moist mucous membranes  NECK: Supple, normal appearance, No JVD; [  ] central line (if applicable)  CHEST/LUNG: No chest deformity, fair bilateral air entry; No rales, rhonchi, wheezing  HEART: Regular rate and rhythm; No murmurs, rubs, or gallops;   ABDOMEN: Soft, Nontender, Nondistended; Bowel sounds present  EXTREMITIES:  + Peripheral Pulses, No clubbing, cyanosis, or edema  NERVOUS SYSTEM: awake and alert  LYMPH: No lymphadenopathy noted  SKIN: No rashes or lesions; good turgor, warm, dry      LABS:  CBC Full  -  ( 25 Mar 2019 07:38 )  WBC Count : 5.58 K/uL  Hemoglobin : 12.0 g/dL  Hematocrit : 36.5 %  Platelet Count - Automated : 142 K/uL  Mean Cell Volume : 94.1 fl  Mean Cell Hemoglobin : 30.9 pg  Mean Cell Hemoglobin Concentration : 32.9 gm/dL  Auto Neutrophil # : x  Auto Lymphocyte # : x  Auto Monocyte # : x  Auto Eosinophil # : x  Auto Basophil # : x  Auto Neutrophil % : x  Auto Lymphocyte % : x  Auto Monocyte % : x  Auto Eosinophil % : x  Auto Basophil % : x    03-25    137  |  104  |  19<H>  ----------------------------<  370<H>  4.2   |  27  |  0.61    Ca    8.2<L>      25 Mar 2019 07:38  Phos  3.9     03-24  Mg     1.7     03-24    TPro  6.2  /  Alb  2.8<L>  /  TBili  0.5  /  DBili  x   /  AST  22  /  ALT  21  /  AlkPhos  86  03-25            RADIOLOGY & ADDITIONAL STUDIES REVIEWED         IMPRESSION:  PAST MEDICAL & SURGICAL HISTORY:  No pertinent past medical history   p/w       IMP:43 F with history of DM type 1 diagnosed at 7 year old  from home presented with nausea and vomiting for 1 days and found to have BS >500 at home. Patient was on insulin pump since she was diagnosed at 7 and cut off 2 years ago for insurance and started on Basaglar 10 U at night and Humalog 10U mealtime/TID and compliance with medication and diet. Her BS range from 40 to 200s, did not have severe hyperglycemic attack since she was diagnosed. Pt also endorsed some discomfort in epigastric and LUQ area. Denied recent flu, diarrhea, fever, chills, dysuria and any other symptoms.   In ED,  vitals were stable. , HCO2 12, AG 19, Acetone large, pH 7.16 on presentation. pt receive 2L NS and 15U Humalog  Pat admitted to icu for DKA, severe metabolic acidosis and gastroenteritis. DKA resolved but blood sugar is very high. Will restart lantus          Sugg  -continue insulin as per endo  -out pat endo pump  -diet  - compliance

## 2019-03-25 NOTE — PROGRESS NOTE ADULT - SUBJECTIVE AND OBJECTIVE BOX
Patient is a 43y old  Female who presents with a chief complaint of DKA (25 Mar 2019 10:08)    pt seen in icu [  ], reg med floor [ x  ], bed [  ], chair at bedside [ x  ], a+o x3 [ x ], lethargic [  ],  nad [ x ]      Allergies    penicillin (Hives)  penicillin (Rash)        Vitals    T(F): 97.8 (03-25-19 @ 05:00), Max: 97.9 (03-24-19 @ 21:30)  HR: 72 (03-25-19 @ 05:00) (72 - 86)  BP: 107/58 (03-25-19 @ 05:00) (105/62 - 109/65)  RR: 18 (03-25-19 @ 05:00) (15 - 18)  SpO2: 98% (03-25-19 @ 05:00) (98% - 100%)  Wt(kg): --  CAPILLARY BLOOD GLUCOSE      POCT Blood Glucose.: 143 mg/dL (25 Mar 2019 11:43)      Labs                          12.0   5.58  )-----------( 142      ( 25 Mar 2019 07:38 )             36.5       03-25    137  |  104  |  19<H>  ----------------------------<  370<H>  4.2   |  27  |  0.61    Ca    8.2<L>      25 Mar 2019 07:38  Phos  3.9     03-24  Mg     1.7     03-24    TPro  6.2  /  Alb  2.8<L>  /  TBili  0.5  /  DBili  x   /  AST  22  /  ALT  21  /  AlkPhos  86  03-25      Hemoglobin A1C, Whole Blood: 10.8: Method: Immunoassay       Reference Range                4.0-5.6%       High risk (prediabetic)        5.7-6.4%       Diabetic, diagnostic             >=6.5%       ADA diabetic treatment goal       <7.0%  The Hemoglobin A1c testing is NGSP-certified.Reference ranges are based  upon the 2010 recommendations of  the American Diabetes Association.  Interpretation may vary for children  and adolescents. % (03.24.19 @ 12:36)          .Urine  03-21 @ 23:08   No growth  --  --          Radiology Results      Meds    MEDICATIONS  (STANDING):  enoxaparin Injectable 40 milliGRAM(s) SubCutaneous daily  ergocalciferol 58177 Unit(s) Oral <User Schedule>  influenza   Vaccine 0.5 milliLiter(s) IntraMuscular once  insulin glargine Injectable (LANTUS) 8 Unit(s) SubCutaneous every morning  insulin lispro (HumaLOG) corrective regimen sliding scale   SubCutaneous three times a day before meals  insulin lispro (HumaLOG) corrective regimen sliding scale   SubCutaneous at bedtime  insulin lispro Injectable (HumaLOG) 10 Unit(s) SubCutaneous three times a day before meals  pantoprazole    Tablet 40 milliGRAM(s) Oral before breakfast      MEDICATIONS  (PRN):  acetaminophen   Tablet .. 650 milliGRAM(s) Oral every 6 hours PRN Mild Pain (1 - 3)      Physical Exam      Neuro :  no focal deficits  Respiratory: CTA B/L  CV: RRR, S1S2, no murmurs,   Abdominal: Soft, NT, ND +BS,  Extremities: No edema, + peripheral pulses    ASSESSMENT    Type 1 diabetes mellitus with ketoacidosis without coma  vit d defficiency      PLAN      endo cons noted  change lantus to 10 units  humalog to 6 ac tid  fine tuning as out pt  pump tx as out pt  hgba1c 10.8 noted above  cont current meds  pt will f/u in offic with me as primary and dr pandey for endo Patient is a 43y old  Female who presents with a chief complaint of DKA (25 Mar 2019 10:08)    pt seen in icu [  ], reg med floor [ x  ], bed [  ], chair at bedside [ x  ], a+o x3 [ x ], lethargic [  ],  nad [ x ]      Allergies    penicillin (Hives)  penicillin (Rash)        Vitals    T(F): 97.8 (03-25-19 @ 05:00), Max: 97.9 (03-24-19 @ 21:30)  HR: 72 (03-25-19 @ 05:00) (72 - 86)  BP: 107/58 (03-25-19 @ 05:00) (105/62 - 109/65)  RR: 18 (03-25-19 @ 05:00) (15 - 18)  SpO2: 98% (03-25-19 @ 05:00) (98% - 100%)  Wt(kg): --  CAPILLARY BLOOD GLUCOSE      POCT Blood Glucose.: 143 mg/dL (25 Mar 2019 11:43)      Labs                          12.0   5.58  )-----------( 142      ( 25 Mar 2019 07:38 )             36.5       03-25    137  |  104  |  19<H>  ----------------------------<  370<H>  4.2   |  27  |  0.61    Ca    8.2<L>      25 Mar 2019 07:38  Phos  3.9     03-24  Mg     1.7     03-24    TPro  6.2  /  Alb  2.8<L>  /  TBili  0.5  /  DBili  x   /  AST  22  /  ALT  21  /  AlkPhos  86  03-25      Hemoglobin A1C, Whole Blood: 10.8: Method: Immunoassay       Reference Range                4.0-5.6%       High risk (prediabetic)        5.7-6.4%       Diabetic, diagnostic             >=6.5%       ADA diabetic treatment goal       <7.0%  The Hemoglobin A1c testing is NGSP-certified.Reference ranges are based  upon the 2010 recommendations of  the American Diabetes Association.  Interpretation may vary for children  and adolescents. % (03.24.19 @ 12:36)          .Urine  03-21 @ 23:08   No growth  --  --          Radiology Results      Meds    MEDICATIONS  (STANDING):  enoxaparin Injectable 40 milliGRAM(s) SubCutaneous daily  ergocalciferol 70232 Unit(s) Oral <User Schedule>  influenza   Vaccine 0.5 milliLiter(s) IntraMuscular once  insulin glargine Injectable (LANTUS) 8 Unit(s) SubCutaneous every morning  insulin lispro (HumaLOG) corrective regimen sliding scale   SubCutaneous three times a day before meals  insulin lispro (HumaLOG) corrective regimen sliding scale   SubCutaneous at bedtime  insulin lispro Injectable (HumaLOG) 10 Unit(s) SubCutaneous three times a day before meals  pantoprazole    Tablet 40 milliGRAM(s) Oral before breakfast      MEDICATIONS  (PRN):  acetaminophen   Tablet .. 650 milliGRAM(s) Oral every 6 hours PRN Mild Pain (1 - 3)      Physical Exam      Neuro :  no focal deficits  Respiratory: CTA B/L  CV: RRR, S1S2, no murmurs,   Abdominal: Soft, NT, ND +BS,  Extremities: No edema, + peripheral pulses    ASSESSMENT    Type 1 diabetes mellitus with ketoacidosis without coma  vit d defficiency      PLAN      endo cons noted  change lantus to 10 units  humalog to 6 ac tid  fine tuning as out pt  pump tx as out pt  hgba1c 10.8 noted above  cont current meds  pt will f/u in offic with me as primary (buisness card given) and dr pandey for endo  pt stable for d/c

## 2019-03-25 NOTE — DISCHARGE NOTE NURSING/CASE MANAGEMENT/SOCIAL WORK - NSDCDPATPORTLINK_GEN_ALL_CORE
You can access the [facility] Patient Portal, offered by [facility], by registering with the following website: [email or web address]

## 2019-03-25 NOTE — CONSULT NOTE ADULT - PROBLEM SELECTOR RECOMMENDATION 9
resolved  with swings in fsg  change lantus to 10 units  humalog to 6 ac tid  fine tuning as out pt  pump tx as out pt  d/w NP

## 2019-03-25 NOTE — CONSULT NOTE ADULT - SUBJECTIVE AND OBJECTIVE BOX
Patient is a 43y old  Female who presents with a chief complaint of DKA (24 Mar 2019 14:44)      HPI:  43 F with history of DM type 1 diagnosed at 7 year old  from home presented with nausea and vomiting for 1 days and found to have BS >500 at home. Patient was on insulin pump since she was diagnosed at 7 and cut off 2 years ago for insurance and started on Basaglar 10 U at night and Humalog 10U mealtime/TID and compliance with medication and diet. Her BS range from 40 to 200s, did not have severe hyperglycemic attack since she was diagnosed. Pt also endorsed some discomfort in epigastric and LUQ area. Denied recent flu, diarrhea, fever, chills, dysuria and any other symptoms.   In ED,  vitals were stable. , HCO2 12, AG 19, Acetone large, pH 7.16 on presentation. pt receive 2L NS and 15U Humalog (21 Mar 2019 18:45)      PAST MEDICAL & SURGICAL HISTORY:  No pertinent past medical history         MEDICATIONS  (STANDING):  enoxaparin Injectable 40 milliGRAM(s) SubCutaneous daily  ergocalciferol 47310 Unit(s) Oral <User Schedule>  influenza   Vaccine 0.5 milliLiter(s) IntraMuscular once  insulin glargine Injectable (LANTUS) 8 Unit(s) SubCutaneous every morning  insulin lispro (HumaLOG) corrective regimen sliding scale   SubCutaneous three times a day before meals  insulin lispro (HumaLOG) corrective regimen sliding scale   SubCutaneous at bedtime  insulin lispro Injectable (HumaLOG) 10 Unit(s) SubCutaneous three times a day before meals  pantoprazole    Tablet 40 milliGRAM(s) Oral before breakfast    MEDICATIONS  (PRN):  acetaminophen   Tablet .. 650 milliGRAM(s) Oral every 6 hours PRN Mild Pain (1 - 3)      FAMILY HISTORY:      SOCIAL HISTORY:      REVIEW OF SYSTEMS:  CONSTITUTIONAL: No fever, weight loss, or fatigue  EYES: No eye pain, visual disturbances, or discharge  ENT:  No difficulty hearing, tinnitus, vertigo; No sinus or throat pain  NECK: No pain or stiffness  RESPIRATORY: No cough, wheezing, chills or hemoptysis; No Shortness of Breath  CARDIOVASCULAR: No chest pain, palpitations, passing out, dizziness, or leg swelling  GASTROINTESTINAL: No abdominal or epigastric pain. No nausea, vomiting, or hematemesis; No diarrhea or constipation. No melena or hematochezia.  GENITOURINARY: No dysuria, frequency, hematuria, or incontinence  NEUROLOGICAL: No headaches, memory loss, loss of strength, numbness, or tremors  SKIN: No itching, burning, rashes, or lesions   LYMPH Nodes: No enlarged glands  ENDOCRINE: No heat or cold intolerance; No hair loss  MUSCULOSKELETAL: No joint pain or swelling; No muscle, back, or extremity pain  PSYCHIATRIC: No depression, anxiety, mood swings, or difficulty sleeping  HEME/LYMPH: No easy bruising, or bleeding gums  ALLERGY AND IMMUNOLOGIC: No hives or eczema	        Vital Signs Last 24 Hrs  T(C): 36.6 (25 Mar 2019 05:00), Max: 36.6 (24 Mar 2019 14:08)  T(F): 97.8 (25 Mar 2019 05:00), Max: 97.9 (24 Mar 2019 21:30)  HR: 72 (25 Mar 2019 05:00) (72 - 86)  BP: 107/58 (25 Mar 2019 05:00) (105/62 - 109/65)  BP(mean): --  RR: 18 (25 Mar 2019 05:00) (15 - 18)  SpO2: 98% (25 Mar 2019 05:00) (98% - 100%)      Constitutional:    NC/AT:    HEENT:    Neck:  No JVD, bruits or thyromegaly    Respiratory:  Clear without rales or rhonchi    Cardiovascular:  RR without murmur, rub or gallop.    Gastrointestinal: Soft without hepatosplenomegaly.    Extremities: without cyanosis, clubbing or edema.    Neurological:  Oriented   x      . No gross sensory or motor defects.        LABS:                        12.0   5.58  )-----------( 142      ( 25 Mar 2019 07:38 )             36.5     03-25    137  |  104  |  19<H>  ----------------------------<  370<H>  4.2   |  27  |  0.61    Ca    8.2<L>      25 Mar 2019 07:38  Phos  3.9     03-24  Mg     1.7     03-24    TPro  6.2  /  Alb  2.8<L>  /  TBili  0.5  /  DBili  x   /  AST  22  /  ALT  21  /  AlkPhos  86  03-25            CAPILLARY BLOOD GLUCOSE      POCT Blood Glucose.: 356 mg/dL (25 Mar 2019 07:51)  POCT Blood Glucose.: 303 mg/dL (25 Mar 2019 04:13)  POCT Blood Glucose.: 308 mg/dL (25 Mar 2019 02:43)  POCT Blood Glucose.: 83 mg/dL (24 Mar 2019 22:39)  POCT Blood Glucose.: 107 mg/dL (24 Mar 2019 19:26)  POCT Blood Glucose.: 209 mg/dL (24 Mar 2019 16:55)  POCT Blood Glucose.: 150 mg/dL (24 Mar 2019 15:19)  POCT Blood Glucose.: 67 mg/dL (24 Mar 2019 13:28)  POCT Blood Glucose.: 72 mg/dL (24 Mar 2019 12:01)  POCT Blood Glucose.: 56 mg/dL (24 Mar 2019 11:23)  POCT Blood Glucose.: 54 mg/dL (24 Mar 2019 11:20)      RADIOLOGY & ADDITIONAL STUDIES: Patient is a 43y old  Female who presents with a chief complaint of DKA (24 Mar 2019 14:44)      HPI:  43 F with history of DM type 1 diagnosed at 7 year old  from home presented with nausea and vomiting for 1 days and found to have BS >500 at home. Patient was on insulin pump since she was diagnosed at 7 and cut off 2 years ago for insurance and started on Basaglar 10 U at night and Humalog 10U mealtime/TID and compliance with medication and diet. Her BS range from 40 to 200s, did not have severe hyperglycemic attack since she was diagnosed. Pt also endorsed some discomfort in epigastric and LUQ area. Denied recent flu, diarrhea, fever, chills, dysuria and any other symptoms. Found to be in DKA. S/p iv insulin. Pt admits to compliance with diet and insulin. No recent md f/u as busy with work.   In ED,  vitals were stable. , HCO2 12, AG 19, Acetone large, pH 7.16 on presentation. pt receive 2L NS and 15U Humalog (21 Mar 2019 18:45)      PAST MEDICAL & SURGICAL HISTORY:  No pertinent past medical history         MEDICATIONS  (STANDING):  enoxaparin Injectable 40 milliGRAM(s) SubCutaneous daily  ergocalciferol 45075 Unit(s) Oral <User Schedule>  influenza   Vaccine 0.5 milliLiter(s) IntraMuscular once  insulin glargine Injectable (LANTUS) 8 Unit(s) SubCutaneous every morning  insulin lispro (HumaLOG) corrective regimen sliding scale   SubCutaneous three times a day before meals  insulin lispro (HumaLOG) corrective regimen sliding scale   SubCutaneous at bedtime  insulin lispro Injectable (HumaLOG) 10 Unit(s) SubCutaneous three times a day before meals  pantoprazole    Tablet 40 milliGRAM(s) Oral before breakfast    MEDICATIONS  (PRN):  acetaminophen   Tablet .. 650 milliGRAM(s) Oral every 6 hours PRN Mild Pain (1 - 3)      FAMILY HISTORY:      SOCIAL HISTORY:      REVIEW OF SYSTEMS:  CONSTITUTIONAL: No fever, weight loss, or fatigue  EYES: No eye pain, visual disturbances, or discharge  ENT:  No difficulty hearing, tinnitus, vertigo; No sinus or throat pain  NECK: No pain or stiffness  RESPIRATORY: No cough, wheezing, chills or hemoptysis; No Shortness of Breath  CARDIOVASCULAR: No chest pain, palpitations, passing out, dizziness, or leg swelling  GASTROINTESTINAL: No abdominal or epigastric pain. No nausea, vomiting, or hematemesis; No diarrhea or constipation. No melena or hematochezia.  GENITOURINARY: No dysuria, frequency, hematuria, or incontinence  NEUROLOGICAL: No headaches, memory loss, loss of strength, numbness, or tremors  SKIN: No itching, burning, rashes, or lesions   LYMPH Nodes: No enlarged glands  ENDOCRINE: No heat or cold intolerance; No hair loss  MUSCULOSKELETAL: No joint pain or swelling; No muscle, back, or extremity pain  PSYCHIATRIC: No depression, anxiety, mood swings, or difficulty sleeping  HEME/LYMPH: No easy bruising, or bleeding gums  ALLERGY AND IMMUNOLOGIC: No hives or eczema	        Vital Signs Last 24 Hrs  T(C): 36.6 (25 Mar 2019 05:00), Max: 36.6 (24 Mar 2019 14:08)  T(F): 97.8 (25 Mar 2019 05:00), Max: 97.9 (24 Mar 2019 21:30)  HR: 72 (25 Mar 2019 05:00) (72 - 86)  BP: 107/58 (25 Mar 2019 05:00) (105/62 - 109/65)  BP(mean): --  RR: 18 (25 Mar 2019 05:00) (15 - 18)  SpO2: 98% (25 Mar 2019 05:00) (98% - 100%)      Constitutional:    HEENT: nad    Neck:  No JVD, bruits or thyromegaly    Respiratory:  Clear without rales or rhonchi    Cardiovascular:  RR without murmur, rub or gallop.    Gastrointestinal: Soft without hepatosplenomegaly.    Extremities: without cyanosis, clubbing or edema.    Neurological:  Oriented   x  3    . No gross sensory or motor defects.        LABS:                        12.0   5.58  )-----------( 142      ( 25 Mar 2019 07:38 )             36.5     03-25    137  |  104  |  19<H>  ----------------------------<  370<H>  4.2   |  27  |  0.61    Ca    8.2<L>      25 Mar 2019 07:38  Phos  3.9     03-24  Mg     1.7     03-24    TPro  6.2  /  Alb  2.8<L>  /  TBili  0.5  /  DBili  x   /  AST  22  /  ALT  21  /  AlkPhos  86  03-25            CAPILLARY BLOOD GLUCOSE      POCT Blood Glucose.: 356 mg/dL (25 Mar 2019 07:51)  POCT Blood Glucose.: 303 mg/dL (25 Mar 2019 04:13)  POCT Blood Glucose.: 308 mg/dL (25 Mar 2019 02:43)  POCT Blood Glucose.: 83 mg/dL (24 Mar 2019 22:39)  POCT Blood Glucose.: 107 mg/dL (24 Mar 2019 19:26)  POCT Blood Glucose.: 209 mg/dL (24 Mar 2019 16:55)  POCT Blood Glucose.: 150 mg/dL (24 Mar 2019 15:19)  POCT Blood Glucose.: 67 mg/dL (24 Mar 2019 13:28)  POCT Blood Glucose.: 72 mg/dL (24 Mar 2019 12:01)  POCT Blood Glucose.: 56 mg/dL (24 Mar 2019 11:23)  POCT Blood Glucose.: 54 mg/dL (24 Mar 2019 11:20)      RADIOLOGY & ADDITIONAL STUDIES:

## 2019-08-15 ENCOUNTER — INPATIENT (INPATIENT)
Facility: HOSPITAL | Age: 44
LOS: 1 days | Discharge: HOME | DRG: 639 | End: 2019-08-17
Attending: INTERNAL MEDICINE | Admitting: INTERNAL MEDICINE
Payer: COMMERCIAL

## 2019-08-15 VITALS
SYSTOLIC BLOOD PRESSURE: 139 MMHG | TEMPERATURE: 98 F | DIASTOLIC BLOOD PRESSURE: 77 MMHG | OXYGEN SATURATION: 97 % | RESPIRATION RATE: 16 BRPM | HEART RATE: 97 BPM

## 2019-08-15 LAB
ALBUMIN SERPL ELPH-MCNC: 4.3 G/DL — SIGNIFICANT CHANGE UP (ref 3.3–5)
ALBUMIN SERPL ELPH-MCNC: 5 G/DL — SIGNIFICANT CHANGE UP (ref 3.4–5)
ALP SERPL-CCNC: 67 U/L — SIGNIFICANT CHANGE UP (ref 40–120)
ALP SERPL-CCNC: 86 U/L — SIGNIFICANT CHANGE UP (ref 40–120)
ALT FLD-CCNC: 16 U/L — SIGNIFICANT CHANGE UP (ref 10–45)
ALT FLD-CCNC: 26 U/L — SIGNIFICANT CHANGE UP (ref 12–42)
ANION GAP SERPL CALC-SCNC: 21 MMOL/L — HIGH (ref 5–17)
ANION GAP SERPL CALC-SCNC: 24 MMOL/L — HIGH (ref 9–16)
APPEARANCE UR: CLEAR — SIGNIFICANT CHANGE UP
AST SERPL-CCNC: 17 U/L — SIGNIFICANT CHANGE UP (ref 10–40)
AST SERPL-CCNC: 20 U/L — SIGNIFICANT CHANGE UP (ref 15–37)
BASOPHILS # BLD AUTO: 0.03 K/UL — SIGNIFICANT CHANGE UP (ref 0–0.2)
BASOPHILS NFR BLD AUTO: 0.2 % — SIGNIFICANT CHANGE UP (ref 0–2)
BASOPHILS NFR BLD AUTO: 0.4 % — SIGNIFICANT CHANGE UP (ref 0–2)
BILIRUB SERPL-MCNC: 0.5 MG/DL — SIGNIFICANT CHANGE UP (ref 0.2–1.2)
BILIRUB SERPL-MCNC: 1.3 MG/DL — HIGH (ref 0.2–1.2)
BILIRUB UR-MCNC: NEGATIVE — SIGNIFICANT CHANGE UP
BUN SERPL-MCNC: 17 MG/DL — SIGNIFICANT CHANGE UP (ref 7–23)
BUN SERPL-MCNC: 21 MG/DL — SIGNIFICANT CHANGE UP (ref 7–23)
CALCIUM SERPL-MCNC: 8.3 MG/DL — LOW (ref 8.4–10.5)
CALCIUM SERPL-MCNC: 9.7 MG/DL — SIGNIFICANT CHANGE UP (ref 8.5–10.5)
CHLORIDE SERPL-SCNC: 105 MMOL/L — SIGNIFICANT CHANGE UP (ref 96–108)
CHLORIDE SERPL-SCNC: 98 MMOL/L — SIGNIFICANT CHANGE UP (ref 96–108)
CO2 SERPL-SCNC: 12 MMOL/L — LOW (ref 22–31)
CO2 SERPL-SCNC: 15 MMOL/L — LOW (ref 22–31)
COLOR SPEC: YELLOW — SIGNIFICANT CHANGE UP
CREAT SERPL-MCNC: 0.57 MG/DL — SIGNIFICANT CHANGE UP (ref 0.5–1.3)
CREAT SERPL-MCNC: 0.92 MG/DL — SIGNIFICANT CHANGE UP (ref 0.5–1.3)
DIFF PNL FLD: ABNORMAL
EOSINOPHIL # BLD AUTO: 0 K/UL — SIGNIFICANT CHANGE UP (ref 0–0.5)
EOSINOPHIL NFR BLD AUTO: 0 % — SIGNIFICANT CHANGE UP (ref 0–6)
EOSINOPHIL NFR BLD AUTO: 0 % — SIGNIFICANT CHANGE UP (ref 0–6)
GLUCOSE BLDC GLUCOMTR-MCNC: 160 MG/DL — HIGH (ref 70–99)
GLUCOSE BLDC GLUCOMTR-MCNC: 253 MG/DL — HIGH (ref 70–99)
GLUCOSE SERPL-MCNC: 268 MG/DL — HIGH (ref 70–99)
GLUCOSE SERPL-MCNC: 435 MG/DL — HIGH (ref 70–99)
GLUCOSE UR QL: >=1000
HCG UR QL: NEGATIVE — SIGNIFICANT CHANGE UP
HCT VFR BLD CALC: 38.2 % — SIGNIFICANT CHANGE UP (ref 34.5–45)
HCT VFR BLD CALC: 40 % — SIGNIFICANT CHANGE UP (ref 34.5–45)
HGB BLD-MCNC: 12.1 G/DL — SIGNIFICANT CHANGE UP (ref 11.5–15.5)
HGB BLD-MCNC: 13.5 G/DL — SIGNIFICANT CHANGE UP (ref 11.5–15.5)
IMM GRANULOCYTES NFR BLD AUTO: 0.3 % — SIGNIFICANT CHANGE UP (ref 0–1.5)
IMM GRANULOCYTES NFR BLD AUTO: 0.5 % — SIGNIFICANT CHANGE UP (ref 0–1.5)
KETONES UR-MCNC: >=80 MG/DL
LACTATE SERPL-SCNC: 1.2 MMOL/L — SIGNIFICANT CHANGE UP (ref 0.4–2)
LEUKOCYTE ESTERASE UR-ACNC: NEGATIVE — SIGNIFICANT CHANGE UP
LYMPHOCYTES # BLD AUTO: 0.74 K/UL — LOW (ref 1–3.3)
LYMPHOCYTES # BLD AUTO: 5.5 % — LOW (ref 13–44)
LYMPHOCYTES # BLD AUTO: 7 % — LOW (ref 13–44)
MAGNESIUM SERPL-MCNC: 1.6 MG/DL — SIGNIFICANT CHANGE UP (ref 1.6–2.6)
MCHC RBC-ENTMCNC: 30 PG — SIGNIFICANT CHANGE UP (ref 27–34)
MCHC RBC-ENTMCNC: 30.7 PG — SIGNIFICANT CHANGE UP (ref 27–34)
MCHC RBC-ENTMCNC: 31.7 GM/DL — LOW (ref 32–36)
MCHC RBC-ENTMCNC: 33.8 G/DL — SIGNIFICANT CHANGE UP (ref 32–36)
MCV RBC AUTO: 90.9 FL — SIGNIFICANT CHANGE UP (ref 80–100)
MCV RBC AUTO: 94.8 FL — SIGNIFICANT CHANGE UP (ref 80–100)
MONOCYTES # BLD AUTO: 0.4 K/UL — SIGNIFICANT CHANGE UP (ref 0–0.9)
MONOCYTES NFR BLD AUTO: 3 % — SIGNIFICANT CHANGE UP (ref 2–14)
MONOCYTES NFR BLD AUTO: 3.1 % — SIGNIFICANT CHANGE UP (ref 2–14)
NEUTROPHILS # BLD AUTO: 12.1 K/UL — HIGH (ref 1.8–7.4)
NEUTROPHILS NFR BLD AUTO: 89.2 % — HIGH (ref 43–77)
NEUTROPHILS NFR BLD AUTO: 90.8 % — HIGH (ref 43–77)
NITRITE UR-MCNC: NEGATIVE — SIGNIFICANT CHANGE UP
NRBC # BLD: 0 /100 WBCS — SIGNIFICANT CHANGE UP (ref 0–0)
PCO2 BLDV: 41 MMHG — SIGNIFICANT CHANGE UP (ref 41–51)
PH BLDV: 7.22 — CRITICAL LOW (ref 7.32–7.43)
PH UR: 5 — SIGNIFICANT CHANGE UP (ref 5–8)
PHOSPHATE SERPL-MCNC: 2.6 MG/DL — SIGNIFICANT CHANGE UP (ref 2.5–4.5)
PLATELET # BLD AUTO: 236 K/UL — SIGNIFICANT CHANGE UP (ref 150–400)
PLATELET # BLD AUTO: 282 K/UL — SIGNIFICANT CHANGE UP (ref 150–400)
PO2 BLDV: 29 MMHG — LOW (ref 35–40)
POTASSIUM SERPL-MCNC: 4.1 MMOL/L — SIGNIFICANT CHANGE UP (ref 3.5–5.3)
POTASSIUM SERPL-MCNC: 4.7 MMOL/L — SIGNIFICANT CHANGE UP (ref 3.5–5.3)
POTASSIUM SERPL-SCNC: 4.1 MMOL/L — SIGNIFICANT CHANGE UP (ref 3.5–5.3)
POTASSIUM SERPL-SCNC: 4.7 MMOL/L — SIGNIFICANT CHANGE UP (ref 3.5–5.3)
PROT SERPL-MCNC: 7.7 G/DL — SIGNIFICANT CHANGE UP (ref 6–8.3)
PROT SERPL-MCNC: 8.8 G/DL — HIGH (ref 6.4–8.2)
PROT UR-MCNC: NEGATIVE MG/DL — SIGNIFICANT CHANGE UP
RBC # BLD: 4.03 M/UL — SIGNIFICANT CHANGE UP (ref 3.8–5.2)
RBC # BLD: 4.4 M/UL — SIGNIFICANT CHANGE UP (ref 3.8–5.2)
RBC # FLD: 12.6 % — SIGNIFICANT CHANGE UP (ref 10.3–14.5)
RBC # FLD: 12.8 % — SIGNIFICANT CHANGE UP (ref 10.3–14.5)
SAO2 % BLDV: 46 % — SIGNIFICANT CHANGE UP
SODIUM SERPL-SCNC: 137 MMOL/L — SIGNIFICANT CHANGE UP (ref 132–145)
SODIUM SERPL-SCNC: 138 MMOL/L — SIGNIFICANT CHANGE UP (ref 135–145)
SP GR SPEC: >=1.03 — SIGNIFICANT CHANGE UP (ref 1–1.03)
UROBILINOGEN FLD QL: 0.2 E.U./DL — SIGNIFICANT CHANGE UP
WBC # BLD: 12.4 K/UL — HIGH (ref 3.8–10.5)
WBC # BLD: 13.34 K/UL — HIGH (ref 3.8–10.5)
WBC # FLD AUTO: 12.4 K/UL — HIGH (ref 3.8–10.5)
WBC # FLD AUTO: 13.34 K/UL — HIGH (ref 3.8–10.5)

## 2019-08-15 PROCEDURE — 71046 X-RAY EXAM CHEST 2 VIEWS: CPT | Mod: 26

## 2019-08-15 PROCEDURE — 99291 CRITICAL CARE FIRST HOUR: CPT

## 2019-08-15 PROCEDURE — 74176 CT ABD & PELVIS W/O CONTRAST: CPT | Mod: 26

## 2019-08-15 PROCEDURE — 93010 ELECTROCARDIOGRAM REPORT: CPT

## 2019-08-15 RX ORDER — ENOXAPARIN SODIUM 100 MG/ML
30 INJECTION SUBCUTANEOUS DAILY
Refills: 0 | Status: DISCONTINUED | OUTPATIENT
Start: 2019-08-15 | End: 2019-08-16

## 2019-08-15 RX ORDER — SODIUM CHLORIDE 9 MG/ML
1000 INJECTION, SOLUTION INTRAVENOUS
Refills: 0 | Status: DISCONTINUED | OUTPATIENT
Start: 2019-08-15 | End: 2019-08-16

## 2019-08-15 RX ORDER — SODIUM CHLORIDE 9 MG/ML
1000 INJECTION INTRAMUSCULAR; INTRAVENOUS; SUBCUTANEOUS ONCE
Refills: 0 | Status: COMPLETED | OUTPATIENT
Start: 2019-08-15 | End: 2019-08-15

## 2019-08-15 RX ORDER — INSULIN HUMAN 100 [IU]/ML
15 INJECTION, SOLUTION SUBCUTANEOUS
Qty: 250 | Refills: 0 | Status: DISCONTINUED | OUTPATIENT
Start: 2019-08-15 | End: 2019-08-15

## 2019-08-15 RX ORDER — ONDANSETRON 8 MG/1
4 TABLET, FILM COATED ORAL EVERY 6 HOURS
Refills: 0 | Status: DISCONTINUED | OUTPATIENT
Start: 2019-08-15 | End: 2019-08-17

## 2019-08-15 RX ORDER — ONDANSETRON 8 MG/1
8 TABLET, FILM COATED ORAL ONCE
Refills: 0 | Status: COMPLETED | OUTPATIENT
Start: 2019-08-15 | End: 2019-08-15

## 2019-08-15 RX ORDER — KETOROLAC TROMETHAMINE 30 MG/ML
30 SYRINGE (ML) INJECTION ONCE
Refills: 0 | Status: DISCONTINUED | OUTPATIENT
Start: 2019-08-15 | End: 2019-08-15

## 2019-08-15 RX ORDER — SODIUM CHLORIDE 9 MG/ML
1000 INJECTION, SOLUTION INTRAVENOUS
Refills: 0 | Status: DISCONTINUED | OUTPATIENT
Start: 2019-08-15 | End: 2019-08-15

## 2019-08-15 RX ORDER — CHLORHEXIDINE GLUCONATE 213 G/1000ML
1 SOLUTION TOPICAL
Refills: 0 | Status: DISCONTINUED | OUTPATIENT
Start: 2019-08-15 | End: 2019-08-16

## 2019-08-15 RX ORDER — MORPHINE SULFATE 50 MG/1
4 CAPSULE, EXTENDED RELEASE ORAL ONCE
Refills: 0 | Status: DISCONTINUED | OUTPATIENT
Start: 2019-08-15 | End: 2019-08-15

## 2019-08-15 RX ORDER — INSULIN HUMAN 100 [IU]/ML
6 INJECTION, SOLUTION SUBCUTANEOUS
Qty: 100 | Refills: 0 | Status: DISCONTINUED | OUTPATIENT
Start: 2019-08-15 | End: 2019-08-16

## 2019-08-15 RX ADMIN — SODIUM CHLORIDE 150 MILLILITER(S): 9 INJECTION, SOLUTION INTRAVENOUS at 22:34

## 2019-08-15 RX ADMIN — SODIUM CHLORIDE 2000 MILLILITER(S): 9 INJECTION INTRAMUSCULAR; INTRAVENOUS; SUBCUTANEOUS at 18:31

## 2019-08-15 RX ADMIN — SODIUM CHLORIDE 1000 MILLILITER(S): 9 INJECTION INTRAMUSCULAR; INTRAVENOUS; SUBCUTANEOUS at 18:31

## 2019-08-15 RX ADMIN — Medication 30 MILLIGRAM(S): at 20:03

## 2019-08-15 RX ADMIN — ONDANSETRON 8 MILLIGRAM(S): 8 TABLET, FILM COATED ORAL at 18:31

## 2019-08-15 RX ADMIN — INSULIN HUMAN 6 UNIT(S)/HR: 100 INJECTION, SOLUTION SUBCUTANEOUS at 20:04

## 2019-08-15 NOTE — ED PROVIDER NOTE - ATTENDING CONTRIBUTION TO CARE
Patient with pH 7.22, 80 ketones in urine and CO2 15 and glucose >400, DKA, admit to Dr Jef Goodwin ICU and starting insulin gtt

## 2019-08-15 NOTE — ED PROVIDER NOTE - OBJECTIVE STATEMENT
PMHX DM type 1 with insulin pump, poorly controlled last [facility] 11, -400's presents with BL flank pain since yesturday. suprapubic abdominal pain, nausea ,vomiting x 3, malaise, chills since today. last ate yesturday afternoon. was admitted for DKA 3 months ago. Had missed a recently appointment with endocrine. states that she had similar episode of back pain accompanied by hematuria a month ago. states symptoms self resolved after 2 days.

## 2019-08-15 NOTE — H&P ADULT - ATTENDING COMMENTS
44 year old female with DKA with possible insulin pump failure. Anion gap has closed and NPH given. Restart diet. Endocrinology consultation. Transfer to floor. Rest as above.

## 2019-08-15 NOTE — H&P ADULT - NSICDXPASTMEDICALHX_GEN_ALL_CORE_FT
PAST MEDICAL HISTORY:  Insulin dependent type 1 diabetes mellitus     No pertinent past medical history PAST MEDICAL HISTORY:  Insulin dependent type 1 diabetes mellitus

## 2019-08-15 NOTE — H&P ADULT - HISTORY OF PRESENT ILLNESS
44F with T1DM (diagnosed at age 7, on an insulin pump) was in her usual state of health at work when she started feeling dizzy and nauseous throughout the day. She had 3 episodes of NBNB vomiting, and was brought into the ED by her friend. She endorses back pain which she describes as constant and dull and has been present for a few months. She says the pain is worse with motion and sometimes improves with Tylenol or ibuprofen. She had been worked up for a kidney stone in the past, but she says her current pain feels different when compared to that incident. While she endorses occasional chills, she denies fevers, arthralgias, dyspnea, CP, bloating, diarrhea, constipation, dysuria, weight changes, recent travel or sick contacts.     The patient was also hospitalized and treated for DKA about 5 months ago. At that episode, she had also presented with nausea and vomiting. Prior to that first episode of DKA, the patient has had no adverse events and had been well controlled with fingersticks in the 120-150s. The patient does report that these last few months her insurance coverage has changed and does not "pay for all the test trips" so the frequency with which she checks her BG has dropped to 2-3 times daily, with BG ranging from 200-300s.     In the ED, 44F with T1DM (diagnosed at age 7, on an insulin pump) was in her usual state of health at work when she started feeling dizzy and nauseous throughout the day. She had 3 episodes of NBNB vomiting, and was brought into the ED by her friend. She endorses back pain which she describes as constant and dull and has been present for a few months. She says the pain is worse with motion and sometimes improves with Tylenol or ibuprofen. She had been worked up for a kidney stone in the past, but she says her current pain feels different when compared to that incident. While she endorses occasional chills, she denies fevers, arthralgias, dyspnea, CP, bloating, diarrhea, constipation, dysuria, weight changes, recent travel or sick contacts.     The patient was also hospitalized and treated for DKA about 5 months ago. At that episode, she had also presented with nausea and vomiting. Prior to that first episode of DKA, the patient has had no adverse events and had been well controlled with fingersticks in the 120-150s. The patient does report that these last few months her insurance coverage has changed and does not "pay for all the test trips" so the frequency with which she checks her BG has dropped to 2-3 times daily, with BG ranging from 200-300s.     In the ED,     The patient was transferred to the MICU on an insulin drip, for further management of DKA 44F with T1DM (diagnosed at age 7, on an insulin pump) was in her usual state of health at work when she started feeling dizzy and nauseous throughout the day. She had 3 episodes of NBNB vomiting, and was brought into the ED by her friend. She endorses back pain which she describes as constant and dull and has been present for a few months. She says the pain is worse with motion and sometimes improves with Tylenol or ibuprofen. She had been worked up for a kidney stone in the past, but she says her current pain feels different when compared to that incident. While she endorses occasional chills, she denies fevers, arthralgias, dyspnea, CP, bloating, diarrhea, constipation, dysuria, weight changes, recent travel or sick contacts. Her last meal was lunch on 8/14.    The patient was also hospitalized and treated for DKA about 5 months ago. At that episode, she had also presented with nausea and vomiting. Prior to that first episode of DKA, the patient has had no adverse events and had been well controlled with fingersticks in the 120-150s. The patient does report that these last few months her insurance coverage has changed and does not "pay for all the test trips" so the frequency with which she checks her BG has dropped to 2-3 times daily, with BG ranging from 200-300s.     In the ED, patient was afebrile Tmax 98.3 with sinus tachycardia to 110, /77. Labs were notable for pH of 7.22, urine ketones 80, bicarb 15, AG 24, blood glucose > 400.    The patient was transferred to the MICU on an insulin drip, for further management of DKA.

## 2019-08-15 NOTE — H&P ADULT - ASSESSMENT
44F with T1DM (diagnosed at age 7, on an insulin pump) admitted to the MICU on 8/15 for management of DKA now on an insulin drip.    NEURO:  - AAOx3, no active issues    ENDOCRINE:  - DKA 44F with T1DM (diagnosed at age 7, on an insulin pump) admitted to the MICU on 8/15 for management of DKA now on an insulin drip.    NEURO:  - AAOx3, no active issues    ENDOCRINE:  #DKA  - initially presented with pH of 7.22, urine ketones 80, bicarb 15, AG 24, blood glucose > 400  - being treated on DKA protocol 44F with T1DM (diagnosed at age 7, on an insulin pump) admitted to the MICU on 8/15 for management of DKA now on an insulin drip.    NEURO:  - AAOx3, no active issues    ENDOCRINE:  #DKA  - initially presented with pH of 7.22, urine ketones 80, bicarb 15, AG 24, blood glucose > 400  - per DKA protocol, on insulin gtt @ 6U/hr and D5 1/2NS with 50meq bicarb @ 150cc/hr    #Type 1 Diabetes Mellitus  - diagnosed at age 7, on insulin pump on Novolog  - follows up with endocrinologist, Dr. Pita Aldridge [phone]  - last recorded HbA1c 10.8 (in March 2019, first episode of DKA)  - poor glycemic control likely in the setting of recent health insurance issues    RENAL: 44F with T1DM (diagnosed at age 7, on an insulin pump) admitted to the MICU on 8/15 for management of DKA now on an insulin drip.    NEURO:  - AAOx3, no active issues    ENDOCRINE:  #DKA  - initially presented with pH of 7.22, urine ketones 80, bicarb 15, AG 24, blood glucose > 400  - per DKA protocol, on insulin gtt @ 6U/hr and D5 1/2NS with 50meq bicarb @ 150cc/hr    #Type 1 Diabetes Mellitus  - diagnosed at age 7, on insulin pump on Novolog  - follows up with endocrinologist, Dr. Pita Aldridge [phone]  - last recorded HbA1c 10.8 (in March 2019, first episode of DKA)  - poor glycemic control likely in the setting of recent health insurance issues    ID:  - low suspicion for infectious process (pyelonephritis vs cholecystitis vs appendicitis)  - no dysuria, no fevers, no CVA tenderness, non-specific back tenderness, - Shepard's, +McBurney's point tenderness but patient describes pain as chronic   - has a mildly elevated WBC 13.34  - UA negative for leuk esterase or nitrites  - CT on 8/15:  - cholelithiasis, but no biliary ductal dilatation   - nonobstructing stone within lower pole of L kidney  - 44F with T1DM (diagnosed at age 7, on an insulin pump) admitted to the MICU on 8/15 for management of DKA now on an insulin drip.    NEURO:  - AAOx3, no active issues    ENDOCRINE:  #DKA  - initially presented with pH of 7.22, urine ketones 80, bicarb 15, AG 24, blood glucose > 400  - per DKA protocol, on insulin gtt @ 6U/hr and D5 1/2NS with 50meq bicarb @ 150cc/hr  - NPO for now    #Type 1 Diabetes Mellitus  - diagnosed at age 7, on insulin pump on Novolog  - follows up with endocrinologist, Dr. Pita Aldridge [phone]  - last recorded HbA1c 10.8 (in March 2019, first episode of DKA)  - poor glycemic control likely in the setting of recent health insurance issues  - f/u repeat HbA1c    ID:  - low suspicion for infectious process (pyelonephritis vs cholecystitis vs appendicitis)  - no dysuria, no fevers, no CVA tenderness, non-specific back tenderness, - Shepard's, +McBurney's point tenderness but patient describes pain as chronic   - has a mildly elevated WBC 13.34  - UA negative for leuk esterase or nitrites  - CT on 8/15:  - cholelithiasis, but no biliary ductal dilatation   - nonobstructing stone within lower pole of L kidney  - no R renal stone, no hydronephrosis  - 3.2 cm L ovarian cyst  - no abx for now    RENAL:  #AG Metabolic Acidosis likely 2/2 DKA  - treatment of DKA as above    #nephrolithiasis  - pt endorses recent history of CVA tenderness with dysuria/hematuria that resolved after 2 days, about 1.5 months ago  - no CVA tenderness now, just nonspecific back pain  - CT Renal Stone Carter showing non obstructing stone within lower pole of L kidney, no R renal stone and no hydronephrosis    PULM:  - no active issues, able to protect airway and maintaining good O2 saturation on RA    CARDIOVASCULAR:  - no active issues, VSS    F:   E: replete PRN  N: NPO 44F with T1DM (diagnosed at age 7, on an insulin pump) admitted to the MICU on 8/15 for management of DKA now on an insulin drip.    NEURO:  - AAOx3, no active issues    ENDOCRINE:  #DKA  - initially presented with pH of 7.22, urine ketones 80, bicarb 15, AG 24, blood glucose > 400  - per DKA protocol, on insulin gtt @ 6U/hr and D5 1/2NS with 50meq bicarb @ 150cc/hr  - NPO for now    #Type 1 Diabetes Mellitus  - diagnosed at age 7, on insulin pump on Novolog  - follows up with endocrinologist, Dr. Pita Aldrdige [phone]  - last recorded HbA1c 10.8 (in March 2019, first episode of DKA)  - poor glycemic control likely in the setting of recent health insurance issues  - f/u repeat HbA1c    ID:  - low suspicion for infectious process (pyelonephritis vs cholecystitis vs appendicitis)  - no dysuria, no fevers, no CVA tenderness, non-specific back tenderness, - Shepard's, +McBurney's point tenderness but patient describes pain as chronic   - has a mildly elevated WBC 13.34  - UA negative for leuk esterase or nitrites  - CT on 8/15:  - cholelithiasis, but no biliary ductal dilatation   - nonobstructing stone within lower pole of L kidney  - no R renal stone, no hydronephrosis  - 3.2 cm L ovarian cyst  - no abx for now    RENAL:  #AG Metabolic Acidosis likely 2/2 DKA  - treatment of DKA as above    #nephrolithiasis  - pt endorses recent history of CVA tenderness with dysuria/hematuria that resolved after 2 days, about 1.5 months ago  - no CVA tenderness now, just nonspecific back pain  - CT Renal Stone Carter showing non obstructing stone within lower pole of L kidney, no R renal stone and no hydronephrosis    PULM:  - no active issues, able to protect airway and maintaining good O2 saturation on RA    CARDIOVASCULAR:  - no active issues, VSS    F: D5 1/2NS with 50meq bicarb @ 150cc/hr  E: replete PRN  N: NPO    DVT ppx: Lovenox    DISPO: MICU

## 2019-08-15 NOTE — CHART NOTE - NSCHARTNOTEFT_GEN_A_CORE
Patient is a 43 yo F w/DM I (diagnosed at age 7, on an insulin pump) w/ recent admission for DKA last 3/2019 at [facility] presented to [facility] complaining of dizziness and nausea. In ED patient was afebrile with Tmax 98.3 with sinus tachycardia to 110, /77. Labs were notable for pH of 7.22, urine ketones 80, bicarb 15, AG 24, blood glucose > 400 c/w DKA. She was started on an insulin gtt and transferred to [facility] MICU for further management.     On arrival to [facility] MICU patient reported feeling fatigued but significantly improved from initial presentation. She denies any infectious symptoms such as rhinorrhea, headache, throat pain, cough, diarrhea, dysuria, fevers or chills. She has been monitoring her FS less frequently due to insurance not paying for her test strips. She still has the insulin pump which was removed in the ED.     VS on arrival to [facility] MICU: T 98.7, , /58, RR 18, O2%19.   Finger stick on arrival was 253.    PHYSICAL EXAM:    Constitutional: WDWN,uncomfortable appearing but in NAD  HEENT: NC/AT, PERRL, EOMI, anicteric sclera, no nasal discharge; uvula midline, no oropharyngeal erythema or exudates; MM dry  Respiratory: CTA B/L; no W/R/R, no retractions  Cardiac: +S1/S2; RRR; no M/R/G; PMI non-displaced  Gastrointestinal: soft, NT/ND; no rebound or guarding; +BSx4  Back: spine midline, no bony tenderness or step-offs; no CVAT B/L  Extremities: WWP, no clubbing or cyanosis; no peripheral edema  Vascular: 2+ radial, PT pulses B/L  Lymphatic: no submandibular or cervical LAD  Neurologic: AAOx3; CNII-XII grossly intact; no focal deficits    A/P:  43 yo F w/DM I (diagnosed at age 7, on an insulin pump) w/ recent admission for DKA last 3/2019 at [facility] presented to [facility] complaining of dizziness and nausea, found to be in DKA and admitted to [facility] MICU for treatment with insulin gtt.    ENDOCRINE  #DKA- DMI with non-adherence to the need for frequent FS checks due to insurance issues. Patient has endocrinologist as an outpatient.   - f/u HbA1c  - c/w insulin gtt with DKA protocol  - BMP monitoring for AG and K, q4h Mg and Phos checks  - q1h FS while on insulin gtt  - 1/2 NS @ 125 cc/hour, D5 when FS <250.   - Will dose lantus once gap closes X2 in anticipation of taking patient off insulin gtt  - NPO while on insulin gtt    RENAL  #Metabolic acidosis- VBG w/ pH 7.22, urine ketones, bicarb low at 15, AG 24 in the setting of DKA. Lactate wnl.   - will add 1 amp bicarb to IVF given most recent Bicarb low at 12.  - c/w DKA protocol as above for treatment of acidosis     #Renal stone- patient reported hematuria and flank pain ~1.5 months ago, self-resolved in 2 days. ED performed CT abd/pelvis with renal stone hunt. renal stone noted on CT however symptoms no longer persistent.   - supportive measures, no symptoms c/w active nephrolithiasis     MSK  #Back pain- unlikely due to renal stone given no obstruction on CT scan, appears more musculoskeletal on exam. UA negative for signs of infection.  - tylenol PRN pain      CARDIO/PULM/NEURO/GI no active issues    F: D5 1/2 NS w/1 amp bicarb @ 150 cc/hour for now  E: Replete aggressively with q4h monitoring   N: NPO while on insulin gtt  GI PPX: None   VTE PPX: Lovenox   DISPO MICU    Plan discussed with intensivist, Dr. Pérez

## 2019-08-15 NOTE — H&P ADULT - NSICDXFAMILYHX_GEN_ALL_CORE_FT
FAMILY HISTORY:  Father  Still living? Unknown  FH: heart disease, Age at diagnosis: Age Unknown  FH: HTN (hypertension), Age at diagnosis: Age Unknown    Mother  Still living? Unknown  FH: HTN (hypertension), Age at diagnosis: Age Unknown

## 2019-08-15 NOTE — H&P ADULT - NSHPSOCIALHISTORY_GEN_ALL_CORE
Pt. lives with her 16 yr. old daughter. She works in maintenance at [employer], and has worked there for 2 years. She has never smoked or used any tobacco products. She rarely drinks alcohol, maybe 1 glass of wine once a year on a special occasion. She has never used drugs.

## 2019-08-15 NOTE — H&P ADULT - NSHPLABSRESULTS_GEN_ALL_CORE
LABS:                        12.1   13.34 )-----------( 236      ( 15 Aug 2019 22:25 )             38.2     08-15    138  |  105  |  17  ----------------------------<  268<H>  4.1   |  12<L>  |  0.57    Ca    8.3<L>      15 Aug 2019 22:25  Phos  2.6     08-15  Mg     1.6     08-15    TPro  7.7  /  Alb  4.3  /  TBili  0.5  /  DBili  x   /  AST  17  /  ALT  16  /  AlkPhos  67  08-15      Urinalysis Basic - ( 15 Aug 2019 18:30 )    Color: Yellow / Appearance: Clear / SG: >=1.030 / pH: x  Gluc: x / Ketone: >=80 mg/dL  / Bili: NEGATIVE / Urobili: 0.2 E.U./dL   Blood: x / Protein: NEGATIVE mg/dL / Nitrite: NEGATIVE   Leuk Esterase: NEGATIVE / RBC: < 5 /HPF / WBC < 5 /HPF   Sq Epi: x / Non Sq Epi: 0-5 /HPF / Bacteria: Present /HPF        RADIOLOGY & ADDITIONAL TESTS: Reviewed.

## 2019-08-15 NOTE — ED ADULT NURSE NOTE - NSFALLRSKOUTCOME_ED_ALL_ED
Quality 431: Preventive Care And Screening: Unhealthy Alcohol Use - Screening: Patient screened for unhealthy alcohol use using a single question and scores less than 2 times per year Quality 226: Preventive Care And Screening: Tobacco Use: Screening And Cessation Intervention: Patient screened for tobacco and never smoked Quality 130: Documentation Of Current Medications In The Medical Record: Current Medications Documented Detail Level: Detailed Quality 110: Preventive Care And Screening: Influenza Immunization: Influenza immunization was not ordered or administered, reason not given Universal Safety Interventions

## 2019-08-15 NOTE — ED PROVIDER NOTE - CLINICAL SUMMARY MEDICAL DECISION MAKING FREE TEXT BOX
PMHX DM type 1 with insulin pump presents with BL flank pain x 2 days, Nausea vomiting inability to tolerate PO's since this morning with malaise in DKA, will give IV hydration, IV insulin PMHX DM type 1 with insulin pump presents with BL flank pain x 2 days, Nausea vomiting inability to tolerate PO's since this morning with malaise in DKA, will give IV hydration, IV insulin drip

## 2019-08-15 NOTE — ED PROVIDER NOTE - CRITICAL CARE PROVIDED
interpretation of diagnostic studies/consultation with other physicians/direct patient care (not related to procedure)/conducted a detailed discussion of DNR status/additional history taking

## 2019-08-15 NOTE — H&P ADULT - NSHPPHYSICALEXAM_GEN_ALL_CORE
PHYSICAL EXAM:    Constitutional: NAD, lying in bed  HEENT: NC/AT; PERRL, anicteric sclera;  Neck: supple, no JVD  Cardiovascular: +S1/S2, RRR, no m/r/g  Respiratory: CTA B/L, no W/R/R  Gastrointestinal: abdomen soft, ND +McBurney point tenderness  Genitourinary: no suprapubic tenderness or fullness, no CVA tenderness  Back: b/l muscular tenderness along lumbar spine  Extremities: WWP; no LE edema; no clubbing or cyanosis,   Vascular: 2+ radial, DP/PT and femoral pulses B/L  Dermatologic:   Neurological: AAOx3 PHYSICAL EXAM:    Constitutional: NAD, lying in bed  HEENT: NC/AT; PERRL, anicteric sclera;  Neck: supple, no JVD  Cardiovascular: +S1/S2, RRR, no m/r/g  Respiratory: CTA B/L, no W/R/R  Gastrointestinal: abdomen soft, ND +McBurney point tenderness  Genitourinary: no suprapubic tenderness or fullness, no CVA tenderness  Back: b/l muscular tenderness along lumbar spine  Extremities: WWP; no LE edema; no clubbing or cyanosis,   Vascular: 2+ radial, DP/PT and femoral pulses B/L  Neurological: AAOx3, cranial nerves grossly intact, reduced sensation in R ring finger PHYSICAL EXAM:    Constitutional: NAD, lying in bed  HEENT: NC/AT; PERRL, anicteric sclera;  Neck: supple, no JVD  Cardiovascular: +S1/S2, RRR, no m/r/g  Respiratory: CTA B/L, no W/R/R  Gastrointestinal: +BS in all 4 quadrants, abdomen soft, ND, -Shepard's, +McBurney point tenderness  Genitourinary: no suprapubic tenderness or fullness, no CVA tenderness  Back: b/l muscular tenderness along lumbar spine  Extremities: WWP; no LE edema; no clubbing or cyanosis,   Vascular: 2+ radial, DP/PT and femoral pulses B/L  Neurological: AAOx3, cranial nerves grossly intact, reduced sensation in R ring finger PHYSICAL EXAM:    Constitutional: NAD, lying in bed  HEENT: NC/AT; PERRL, anicteric sclera;  Neck: supple, no JVD  Cardiovascular: +S1/S2, RRR, no m/r/g  Respiratory: CTA B/L, no W/R/R  Gastrointestinal: +BS in all 4 quadrants, abdomen soft, ND, -Shepard's, +McBurney point tenderness  Genitourinary: no suprapubic tenderness or fullness, no CVA tenderness  Back: b/l muscular tenderness along lumbar spine  Extremities: WWP; no LE edema; no clubbing or cyanosis,   Vascular: 2+ radial, DP/PT and femoral pulses B/L  Skin: excoriations b/l around elbows and forearm  Neurological: AAOx3, cranial nerves grossly intact, reduced sensation in R ring finger

## 2019-08-15 NOTE — ED ADULT NURSE NOTE - OBJECTIVE STATEMENT
Patient to ED PMHX DM type 1 with insulin pump, poorly controlled last [facility] 11, -400's presents with BL flank pain since yesturday. suprapubic abdominal pain, nausea ,vomiting x 3, malaise, chills since today. last ate yesturday afternoon. was admitted for DKA 3 months ago. Had missed a recently appointment with endocrine. states that she had similar episode of back pain accompanied by hematuria a month ago. states symptoms self resolved after 2 days.

## 2019-08-16 ENCOUNTER — TRANSCRIPTION ENCOUNTER (OUTPATIENT)
Age: 44
End: 2019-08-16

## 2019-08-16 DIAGNOSIS — D72.829 ELEVATED WHITE BLOOD CELL COUNT, UNSPECIFIED: ICD-10-CM

## 2019-08-16 DIAGNOSIS — R63.8 OTHER SYMPTOMS AND SIGNS CONCERNING FOOD AND FLUID INTAKE: ICD-10-CM

## 2019-08-16 DIAGNOSIS — Z98.891 HISTORY OF UTERINE SCAR FROM PREVIOUS SURGERY: Chronic | ICD-10-CM

## 2019-08-16 DIAGNOSIS — E10.9 TYPE 1 DIABETES MELLITUS WITHOUT COMPLICATIONS: ICD-10-CM

## 2019-08-16 DIAGNOSIS — E11.10 TYPE 2 DIABETES MELLITUS WITH KETOACIDOSIS WITHOUT COMA: ICD-10-CM

## 2019-08-16 DIAGNOSIS — Z29.9 ENCOUNTER FOR PROPHYLACTIC MEASURES, UNSPECIFIED: ICD-10-CM

## 2019-08-16 DIAGNOSIS — Z91.89 OTHER SPECIFIED PERSONAL RISK FACTORS, NOT ELSEWHERE CLASSIFIED: ICD-10-CM

## 2019-08-16 DIAGNOSIS — N20.0 CALCULUS OF KIDNEY: ICD-10-CM

## 2019-08-16 LAB
ALBUMIN SERPL ELPH-MCNC: 3.8 G/DL — SIGNIFICANT CHANGE UP (ref 3.3–5)
ALBUMIN SERPL ELPH-MCNC: 3.9 G/DL — SIGNIFICANT CHANGE UP (ref 3.3–5)
ALP SERPL-CCNC: 57 U/L — SIGNIFICANT CHANGE UP (ref 40–120)
ALP SERPL-CCNC: 58 U/L — SIGNIFICANT CHANGE UP (ref 40–120)
ALT FLD-CCNC: 13 U/L — SIGNIFICANT CHANGE UP (ref 10–45)
ALT FLD-CCNC: 14 U/L — SIGNIFICANT CHANGE UP (ref 10–45)
ANION GAP SERPL CALC-SCNC: 12 MMOL/L — SIGNIFICANT CHANGE UP (ref 5–17)
ANION GAP SERPL CALC-SCNC: 12 MMOL/L — SIGNIFICANT CHANGE UP (ref 5–17)
AST SERPL-CCNC: 13 U/L — SIGNIFICANT CHANGE UP (ref 10–40)
AST SERPL-CCNC: 14 U/L — SIGNIFICANT CHANGE UP (ref 10–40)
BASOPHILS # BLD AUTO: 0.04 K/UL — SIGNIFICANT CHANGE UP (ref 0–0.2)
BASOPHILS NFR BLD AUTO: 0.3 % — SIGNIFICANT CHANGE UP (ref 0–2)
BILIRUB SERPL-MCNC: 0.6 MG/DL — SIGNIFICANT CHANGE UP (ref 0.2–1.2)
BILIRUB SERPL-MCNC: 0.6 MG/DL — SIGNIFICANT CHANGE UP (ref 0.2–1.2)
BUN SERPL-MCNC: 14 MG/DL — SIGNIFICANT CHANGE UP (ref 7–23)
BUN SERPL-MCNC: 17 MG/DL — SIGNIFICANT CHANGE UP (ref 7–23)
CALCIUM SERPL-MCNC: 8 MG/DL — LOW (ref 8.4–10.5)
CALCIUM SERPL-MCNC: 8 MG/DL — LOW (ref 8.4–10.5)
CHLORIDE SERPL-SCNC: 107 MMOL/L — SIGNIFICANT CHANGE UP (ref 96–108)
CHLORIDE SERPL-SCNC: 108 MMOL/L — SIGNIFICANT CHANGE UP (ref 96–108)
CO2 SERPL-SCNC: 19 MMOL/L — LOW (ref 22–31)
CO2 SERPL-SCNC: 19 MMOL/L — LOW (ref 22–31)
CREAT SERPL-MCNC: 0.53 MG/DL — SIGNIFICANT CHANGE UP (ref 0.5–1.3)
CREAT SERPL-MCNC: 0.53 MG/DL — SIGNIFICANT CHANGE UP (ref 0.5–1.3)
EOSINOPHIL # BLD AUTO: 0 K/UL — SIGNIFICANT CHANGE UP (ref 0–0.5)
EOSINOPHIL NFR BLD AUTO: 0 % — SIGNIFICANT CHANGE UP (ref 0–6)
GLUCOSE BLDC GLUCOMTR-MCNC: 117 MG/DL — HIGH (ref 70–99)
GLUCOSE BLDC GLUCOMTR-MCNC: 123 MG/DL — HIGH (ref 70–99)
GLUCOSE BLDC GLUCOMTR-MCNC: 131 MG/DL — HIGH (ref 70–99)
GLUCOSE BLDC GLUCOMTR-MCNC: 133 MG/DL — HIGH (ref 70–99)
GLUCOSE BLDC GLUCOMTR-MCNC: 137 MG/DL — HIGH (ref 70–99)
GLUCOSE BLDC GLUCOMTR-MCNC: 142 MG/DL — HIGH (ref 70–99)
GLUCOSE BLDC GLUCOMTR-MCNC: 151 MG/DL — HIGH (ref 70–99)
GLUCOSE BLDC GLUCOMTR-MCNC: 153 MG/DL — HIGH (ref 70–99)
GLUCOSE BLDC GLUCOMTR-MCNC: 187 MG/DL — HIGH (ref 70–99)
GLUCOSE BLDC GLUCOMTR-MCNC: 234 MG/DL — HIGH (ref 70–99)
GLUCOSE BLDC GLUCOMTR-MCNC: 303 MG/DL — HIGH (ref 70–99)
GLUCOSE SERPL-MCNC: 155 MG/DL — HIGH (ref 70–99)
GLUCOSE SERPL-MCNC: 189 MG/DL — HIGH (ref 70–99)
HBA1C BLD-MCNC: 9.3 % — HIGH (ref 4–5.6)
HCT VFR BLD CALC: 36 % — SIGNIFICANT CHANGE UP (ref 34.5–45)
HGB BLD-MCNC: 11.6 G/DL — SIGNIFICANT CHANGE UP (ref 11.5–15.5)
IMM GRANULOCYTES NFR BLD AUTO: 0.6 % — SIGNIFICANT CHANGE UP (ref 0–1.5)
LYMPHOCYTES # BLD AUTO: 1.69 K/UL — SIGNIFICANT CHANGE UP (ref 1–3.3)
LYMPHOCYTES # BLD AUTO: 13.6 % — SIGNIFICANT CHANGE UP (ref 13–44)
MAGNESIUM SERPL-MCNC: 1.8 MG/DL — SIGNIFICANT CHANGE UP (ref 1.6–2.6)
MAGNESIUM SERPL-MCNC: 1.9 MG/DL — SIGNIFICANT CHANGE UP (ref 1.6–2.6)
MCHC RBC-ENTMCNC: 30.1 PG — SIGNIFICANT CHANGE UP (ref 27–34)
MCHC RBC-ENTMCNC: 32.2 GM/DL — SIGNIFICANT CHANGE UP (ref 32–36)
MCV RBC AUTO: 93.3 FL — SIGNIFICANT CHANGE UP (ref 80–100)
MONOCYTES # BLD AUTO: 0.96 K/UL — HIGH (ref 0–0.9)
MONOCYTES NFR BLD AUTO: 7.7 % — SIGNIFICANT CHANGE UP (ref 2–14)
NEUTROPHILS # BLD AUTO: 9.63 K/UL — HIGH (ref 1.8–7.4)
NEUTROPHILS NFR BLD AUTO: 77.8 % — HIGH (ref 43–77)
NRBC # BLD: 0 /100 WBCS — SIGNIFICANT CHANGE UP (ref 0–0)
PHOSPHATE SERPL-MCNC: 3 MG/DL — SIGNIFICANT CHANGE UP (ref 2.5–4.5)
PHOSPHATE SERPL-MCNC: 3.1 MG/DL — SIGNIFICANT CHANGE UP (ref 2.5–4.5)
PLATELET # BLD AUTO: 246 K/UL — SIGNIFICANT CHANGE UP (ref 150–400)
POTASSIUM SERPL-MCNC: 3.9 MMOL/L — SIGNIFICANT CHANGE UP (ref 3.5–5.3)
POTASSIUM SERPL-MCNC: 4 MMOL/L — SIGNIFICANT CHANGE UP (ref 3.5–5.3)
POTASSIUM SERPL-SCNC: 3.9 MMOL/L — SIGNIFICANT CHANGE UP (ref 3.5–5.3)
POTASSIUM SERPL-SCNC: 4 MMOL/L — SIGNIFICANT CHANGE UP (ref 3.5–5.3)
PROT SERPL-MCNC: 6.6 G/DL — SIGNIFICANT CHANGE UP (ref 6–8.3)
PROT SERPL-MCNC: 6.9 G/DL — SIGNIFICANT CHANGE UP (ref 6–8.3)
RBC # BLD: 3.86 M/UL — SIGNIFICANT CHANGE UP (ref 3.8–5.2)
RBC # FLD: 12.7 % — SIGNIFICANT CHANGE UP (ref 10.3–14.5)
SODIUM SERPL-SCNC: 138 MMOL/L — SIGNIFICANT CHANGE UP (ref 135–145)
SODIUM SERPL-SCNC: 139 MMOL/L — SIGNIFICANT CHANGE UP (ref 135–145)
WBC # BLD: 12.39 K/UL — HIGH (ref 3.8–10.5)
WBC # FLD AUTO: 12.39 K/UL — HIGH (ref 3.8–10.5)

## 2019-08-16 PROCEDURE — 99233 SBSQ HOSP IP/OBS HIGH 50: CPT | Mod: GC

## 2019-08-16 PROCEDURE — 99222 1ST HOSP IP/OBS MODERATE 55: CPT | Mod: GC

## 2019-08-16 PROCEDURE — 99223 1ST HOSP IP/OBS HIGH 75: CPT | Mod: GC

## 2019-08-16 RX ORDER — MAGNESIUM SULFATE 500 MG/ML
1 VIAL (ML) INJECTION ONCE
Refills: 0 | Status: COMPLETED | OUTPATIENT
Start: 2019-08-16 | End: 2019-08-16

## 2019-08-16 RX ORDER — DEXTROSE 50 % IN WATER 50 %
25 SYRINGE (ML) INTRAVENOUS ONCE
Refills: 0 | Status: DISCONTINUED | OUTPATIENT
Start: 2019-08-16 | End: 2019-08-17

## 2019-08-16 RX ORDER — INSULIN LISPRO 100/ML
3 VIAL (ML) SUBCUTANEOUS
Refills: 0 | Status: DISCONTINUED | OUTPATIENT
Start: 2019-08-16 | End: 2019-08-17

## 2019-08-16 RX ORDER — DEXTROSE 50 % IN WATER 50 %
12.5 SYRINGE (ML) INTRAVENOUS ONCE
Refills: 0 | Status: DISCONTINUED | OUTPATIENT
Start: 2019-08-16 | End: 2019-08-17

## 2019-08-16 RX ORDER — GLUCAGON INJECTION, SOLUTION 0.5 MG/.1ML
1 INJECTION, SOLUTION SUBCUTANEOUS ONCE
Refills: 0 | Status: DISCONTINUED | OUTPATIENT
Start: 2019-08-16 | End: 2019-08-17

## 2019-08-16 RX ORDER — INSULIN GLARGINE 100 [IU]/ML
10 INJECTION, SOLUTION SUBCUTANEOUS AT BEDTIME
Refills: 0 | Status: DISCONTINUED | OUTPATIENT
Start: 2019-08-16 | End: 2019-08-17

## 2019-08-16 RX ORDER — SODIUM CHLORIDE 9 MG/ML
1000 INJECTION, SOLUTION INTRAVENOUS
Refills: 0 | Status: DISCONTINUED | OUTPATIENT
Start: 2019-08-16 | End: 2019-08-16

## 2019-08-16 RX ORDER — INSULIN LISPRO 100/ML
VIAL (ML) SUBCUTANEOUS
Refills: 0 | Status: DISCONTINUED | OUTPATIENT
Start: 2019-08-16 | End: 2019-08-17

## 2019-08-16 RX ORDER — SODIUM CHLORIDE 9 MG/ML
1000 INJECTION, SOLUTION INTRAVENOUS
Refills: 0 | Status: DISCONTINUED | OUTPATIENT
Start: 2019-08-16 | End: 2019-08-17

## 2019-08-16 RX ORDER — POTASSIUM CHLORIDE 20 MEQ
20 PACKET (EA) ORAL ONCE
Refills: 0 | Status: COMPLETED | OUTPATIENT
Start: 2019-08-16 | End: 2019-08-16

## 2019-08-16 RX ORDER — HUMAN INSULIN 100 [IU]/ML
10 INJECTION, SUSPENSION SUBCUTANEOUS ONCE
Refills: 0 | Status: COMPLETED | OUTPATIENT
Start: 2019-08-16 | End: 2019-08-16

## 2019-08-16 RX ORDER — INSULIN HUMAN 100 [IU]/ML
0 INJECTION, SOLUTION SUBCUTANEOUS
Qty: 0 | Refills: 0 | DISCHARGE

## 2019-08-16 RX ORDER — DEXTROSE 50 % IN WATER 50 %
15 SYRINGE (ML) INTRAVENOUS ONCE
Refills: 0 | Status: DISCONTINUED | OUTPATIENT
Start: 2019-08-16 | End: 2019-08-17

## 2019-08-16 RX ADMIN — HUMAN INSULIN 10 UNIT(S): 100 INJECTION, SUSPENSION SUBCUTANEOUS at 08:18

## 2019-08-16 RX ADMIN — INSULIN GLARGINE 10 UNIT(S): 100 INJECTION, SOLUTION SUBCUTANEOUS at 22:23

## 2019-08-16 RX ADMIN — Medication 1: at 22:22

## 2019-08-16 RX ADMIN — Medication 100 GRAM(S): at 00:35

## 2019-08-16 RX ADMIN — Medication 100 GRAM(S): at 08:19

## 2019-08-16 RX ADMIN — SODIUM CHLORIDE 150 MILLILITER(S): 9 INJECTION, SOLUTION INTRAVENOUS at 00:33

## 2019-08-16 RX ADMIN — SODIUM CHLORIDE 100 MILLILITER(S): 9 INJECTION, SOLUTION INTRAVENOUS at 04:27

## 2019-08-16 RX ADMIN — Medication 4: at 18:39

## 2019-08-16 RX ADMIN — Medication 3 UNIT(S): at 18:39

## 2019-08-16 RX ADMIN — Medication 20 MILLIEQUIVALENT(S): at 04:30

## 2019-08-16 NOTE — DIETITIAN INITIAL EVALUATION ADULT. - ADD RECOMMEND
1. Reinforce diet education 2. Monitor lytes and replete prn. POC BG q6hrs 3. Pain and bowel regimens per team

## 2019-08-16 NOTE — PROGRESS NOTE ADULT - SUBJECTIVE AND OBJECTIVE BOX
TRANSFER NOTE: MICU TO [facility]  HOSPITAL COURSE:   Patient is a 45 yo F w/DM I (diagnosed at age 7, on an insulin pump) w/ recent admission for DKA last 3/2019 at [place] presented to [facility] complaining of dizziness and nausea. In ED patient was afebrile with Tmax 98.3 with sinus tachycardia to 110, /77. Labs were notable for pH of 7.22, urine ketones 80, bicarb 15, AG 24, blood glucose > 400 c/w DKA. She was started on an insulin gtt and transferred to [facility] MICU for further management.   On arrival to [facility] MICU patient reported feeling fatigued but significantly improved from initial presentation. She denies any infectious symptoms such as rhinorrhea, headache, throat pain, cough, diarrhea, dysuria, fevers or chills. She has been monitoring her FS less frequently due to insurance not paying for her test strips. She still has the insulin pump which was removed in the ED. VS on arrival to [facility] MICU: T 98.7, , /58, RR 18, O2%19. Finger stick on arrival was 253.   In the MICU she was treated per DKA protocol, on insulin gtt @ 6U/hr and D5 1/2NS with 50meq bicarb @ 150cc/hr. Fingerstick 131 at 3:21 AM, IVF changed to D10 1/2NS @ 100cc/hr. Most recent fingerstick 234 at 9:30AM. Anion gap closed x2 (AG 19 x2 this morning). Insulin drip and IVF d/c. The patient is hemodynamically stable and is ready for transfer     SUBJECTIVE: Patient seen and examined at bedside. She reports feeling better than yesterday; denies nausea, vomiting, dizziness. She admits to pain in her lower abdomen bilaterally which she characterizes as "connected" to her back pain.    OBJECTIVE:     VITAL SIGNS:  ICU Vital Signs Last 24 Hrs  T(C): 36.1 (16 Aug 2019 09:41), Max: 37.4 (15 Aug 2019 20:24)  T(F): 97 (16 Aug 2019 09:41), Max: 99.3 (15 Aug 2019 20:24)  HR: 78 (16 Aug 2019 09:00) (78 - 110)  BP: 105/54 (16 Aug 2019 09:00) (105/54 - 148/70)  BP(mean): 74 (16 Aug 2019 09:00) (71 - 89)  ABP: --  ABP(mean): --  RR: 16 (16 Aug 2019 04:00) (16 - 18)  SpO2: 100% (16 Aug 2019 09:00) (97% - 100%)        08-15 @ 07:01  -  08-16 @ 07:00  --------------------------------------------------------  IN: 1362.2 mL / OUT: 0 mL / NET: 1362.2 mL    08-16 @ 07:01  -  08-16 @ 09:56  --------------------------------------------------------  IN: 100.5 mL / OUT: 0 mL / NET: 100.5 mL      CAPILLARY BLOOD GLUCOSE      POCT Blood Glucose.: 234 mg/dL (16 Aug 2019 09:30)      PHYSICAL EXAM:    General: NAD  HEENT: NC/AT; EOMI, PERRL, clear conjunctiva  Neck: supple  Respiratory: CTA b/l  Cardiovascular: +S1/S2; RRR; no murmurs, gallops, or rubs  Abdomen: Mild pain on palpation of RLQ and LLQ, patient reports pain in R lower back on palpation of RLQ; abdomen soft, nondistended; no rebound tenderness, no guarding, no Rovsing sign.   Extremities: WWP, 2+ peripheral pulses b/l; no LE edema  Skin: normal color and turgor; no rash  Neurological: Alert and oriented    MEDICATIONS:  MEDICATIONS  (STANDING):  chlorhexidine 2% Cloths 1 Application(s) Topical <User Schedule>  dextrose 10% + sodium chloride 0.45%. 1000 milliLiter(s) (100 mL/Hr) IV Continuous <Continuous>  dextrose 5%. 1000 milliLiter(s) (50 mL/Hr) IV Continuous <Continuous>  dextrose 50% Injectable 12.5 Gram(s) IV Push once  dextrose 50% Injectable 25 Gram(s) IV Push once  dextrose 50% Injectable 25 Gram(s) IV Push once  enoxaparin Injectable 30 milliGRAM(s) SubCutaneous daily  insulin regular Infusion 6 Unit(s)/Hr (6 mL/Hr) IV Continuous <Continuous>    MEDICATIONS  (PRN):  dextrose 40% Gel 15 Gram(s) Oral once PRN Blood Glucose LESS THAN 70 milliGRAM(s)/deciliter  glucagon  Injectable 1 milliGRAM(s) IntraMuscular once PRN Glucose LESS THAN 70 milligrams/deciliter  ondansetron Injectable 4 milliGRAM(s) IV Push every 6 hours PRN Nausea and/or Vomiting      ALLERGIES:  Allergies    penicillin (Hives)  penicillin (Rash)    Intolerances        LABS:                        11.6   12.39 )-----------( 246      ( 16 Aug 2019 02:41 )             36.0     08-16    138  |  107  |  14  ----------------------------<  189<H>  4.0   |  19<L>  |  0.53    Ca    8.0<L>      16 Aug 2019 06:36  Phos  3.1     08-16  Mg     1.8     08-16    TPro  6.6  /  Alb  3.8  /  TBili  0.6  /  DBili  x   /  AST  13  /  ALT  13  /  AlkPhos  57  08-16      Urinalysis Basic - ( 15 Aug 2019 18:30 )    Color: Yellow / Appearance: Clear / SG: >=1.030 / pH: x  Gluc: x / Ketone: >=80 mg/dL  / Bili: NEGATIVE / Urobili: 0.2 E.U./dL   Blood: x / Protein: NEGATIVE mg/dL / Nitrite: NEGATIVE   Leuk Esterase: NEGATIVE / RBC: < 5 /HPF / WBC < 5 /HPF   Sq Epi: x / Non Sq Epi: 0-5 /HPF / Bacteria: Present /HPF        RADIOLOGY & ADDITIONAL TESTS: Reviewed. TRANSFER NOTE: MICU TO [facility]  HOSPITAL COURSE:   Patient is a 45 yo F w/DM I (diagnosed at age 7, on an insulin pump) w/ recent admission for DKA last 3/2019 at [place] presented to [facility] complaining of dizziness and nausea. In ED patient was afebrile with Tmax 98.3 with sinus tachycardia to 110, /77. Labs were notable for pH of 7.22, urine ketones 80, bicarb 15, AG 24, blood glucose > 400 c/w DKA. She was started on an insulin gtt and transferred to [facility] MICU for further management.   On arrival to [facility] MICU patient reported feeling fatigued but significantly improved from initial presentation. She denies any infectious symptoms such as rhinorrhea, headache, throat pain, cough, diarrhea, dysuria, fevers or chills. She has been monitoring her FS less frequently due to insurance not paying for her test strips. She still has the insulin pump which was removed in the ED. VS on arrival to [facility] MICU: T 98.7, , /58, RR 18, O2%19. Finger stick on arrival was 253.   In the MICU she was treated per DKA protocol, on insulin gtt @ 6U/hr and D5 1/2NS with 50meq bicarb @ 150cc/hr. Fingerstick 131 at 3:21 AM, IVF changed to D10 1/2NS @ 100cc/hr. Most recent fingerstick 234 at 9:30AM. Anion gap closed x2 (AG 19 x2 this morning). Insulin drip d/c. The patient is hemodynamically stable and is ready for transfer     SUBJECTIVE: Patient seen and examined at bedside. She reports feeling better than yesterday; denies nausea, vomiting, dizziness. She admits to pain in her lower abdomen bilaterally which she characterizes as "connected" to her back pain.    OBJECTIVE:     VITAL SIGNS:  ICU Vital Signs Last 24 Hrs  T(C): 36.1 (16 Aug 2019 09:41), Max: 37.4 (15 Aug 2019 20:24)  T(F): 97 (16 Aug 2019 09:41), Max: 99.3 (15 Aug 2019 20:24)  HR: 78 (16 Aug 2019 09:00) (78 - 110)  BP: 105/54 (16 Aug 2019 09:00) (105/54 - 148/70)  BP(mean): 74 (16 Aug 2019 09:00) (71 - 89)  ABP: --  ABP(mean): --  RR: 16 (16 Aug 2019 04:00) (16 - 18)  SpO2: 100% (16 Aug 2019 09:00) (97% - 100%)        08-15 @ 07:01  -  08-16 @ 07:00  --------------------------------------------------------  IN: 1362.2 mL / OUT: 0 mL / NET: 1362.2 mL    08-16 @ 07:01  -  08-16 @ 09:56  --------------------------------------------------------  IN: 100.5 mL / OUT: 0 mL / NET: 100.5 mL      CAPILLARY BLOOD GLUCOSE      POCT Blood Glucose.: 234 mg/dL (16 Aug 2019 09:30)      PHYSICAL EXAM:    General: NAD  HEENT: NC/AT; EOMI, PERRL, clear conjunctiva  Neck: supple  Respiratory: CTA b/l  Cardiovascular: +S1/S2; RRR; no murmurs, gallops, or rubs  Abdomen: Mild pain on palpation of RLQ and LLQ, patient reports pain in R lower back on palpation of RLQ; abdomen soft, nondistended; no rebound tenderness, no guarding, no Rovsing sign.   Extremities: WWP, 2+ peripheral pulses b/l; no LE edema  Skin: normal color and turgor; no rash  Neurological: Alert and oriented    MEDICATIONS:  MEDICATIONS  (STANDING):  chlorhexidine 2% Cloths 1 Application(s) Topical <User Schedule>  dextrose 10% + sodium chloride 0.45%. 1000 milliLiter(s) (100 mL/Hr) IV Continuous <Continuous>  dextrose 5%. 1000 milliLiter(s) (50 mL/Hr) IV Continuous <Continuous>  dextrose 50% Injectable 12.5 Gram(s) IV Push once  dextrose 50% Injectable 25 Gram(s) IV Push once  dextrose 50% Injectable 25 Gram(s) IV Push once  enoxaparin Injectable 30 milliGRAM(s) SubCutaneous daily  insulin regular Infusion 6 Unit(s)/Hr (6 mL/Hr) IV Continuous <Continuous>    MEDICATIONS  (PRN):  dextrose 40% Gel 15 Gram(s) Oral once PRN Blood Glucose LESS THAN 70 milliGRAM(s)/deciliter  glucagon  Injectable 1 milliGRAM(s) IntraMuscular once PRN Glucose LESS THAN 70 milligrams/deciliter  ondansetron Injectable 4 milliGRAM(s) IV Push every 6 hours PRN Nausea and/or Vomiting      ALLERGIES:  Allergies    penicillin (Hives)  penicillin (Rash)    Intolerances        LABS:                        11.6   12.39 )-----------( 246      ( 16 Aug 2019 02:41 )             36.0     08-16    138  |  107  |  14  ----------------------------<  189<H>  4.0   |  19<L>  |  0.53    Ca    8.0<L>      16 Aug 2019 06:36  Phos  3.1     08-16  Mg     1.8     08-16    TPro  6.6  /  Alb  3.8  /  TBili  0.6  /  DBili  x   /  AST  13  /  ALT  13  /  AlkPhos  57  08-16      Urinalysis Basic - ( 15 Aug 2019 18:30 )    Color: Yellow / Appearance: Clear / SG: >=1.030 / pH: x  Gluc: x / Ketone: >=80 mg/dL  / Bili: NEGATIVE / Urobili: 0.2 E.U./dL   Blood: x / Protein: NEGATIVE mg/dL / Nitrite: NEGATIVE   Leuk Esterase: NEGATIVE / RBC: < 5 /HPF / WBC < 5 /HPF   Sq Epi: x / Non Sq Epi: 0-5 /HPF / Bacteria: Present /HPF        RADIOLOGY & ADDITIONAL TESTS: Reviewed. TRANSFER NOTE: MICU TO [facility]  HOSPITAL COURSE:   Patient is a 43 yo F w/DM I (diagnosed at age 7, on an insulin pump) w/ recent admission for DKA last 3/2019 at [place] who presented to [facility] complaining of dizziness and nausea. In ED patient was afebrile with Tmax 98.3 with sinus tachycardia to 110, /77. Labs were notable for pH of 7.22, urine ketones 80, bicarb 15, AG 24, blood glucose > 400 c/w DKA. She was started on an insulin gtt and transferred to [facility] MICU for further management.   On arrival to [facility] MICU patient reported feeling fatigued but significantly improved from initial presentation. She denies any infectious symptoms such as rhinorrhea, headache, throat pain, cough, diarrhea, dysuria, fevers or chills. She has been monitoring her FS less frequently due to insurance not paying for her test strips. Insulin pump was removed in the ED. In the MICU she was treated per DKA protocol, on insulin gtt @ 6U/hr and D5 1/2NS with 50meq bicarb @ 150cc/hr. Fingerstick 131 at 3:21 AM, IVF changed to D10 1/2NS. Most recent fingerstick 234 at 9:30AM. Anion gap closed x2 (AG 19 x2 this morning). Given NPH at 8:15am, Insulin drip and fluids d/c ~10:15am. The patient is hemodynamically stable and is ready for transfer     SUBJECTIVE: Patient seen and examined at bedside. She reports feeling better than yesterday; denies nausea, vomiting, dizziness. She admits to pain in her lower abdomen bilaterally which she characterizes as "connected" to her back pain.    OBJECTIVE:     VITAL SIGNS:  ICU Vital Signs Last 24 Hrs  T(C): 36.1 (16 Aug 2019 09:41), Max: 37.4 (15 Aug 2019 20:24)  T(F): 97 (16 Aug 2019 09:41), Max: 99.3 (15 Aug 2019 20:24)  HR: 78 (16 Aug 2019 09:00) (78 - 110)  BP: 105/54 (16 Aug 2019 09:00) (105/54 - 148/70)  BP(mean): 74 (16 Aug 2019 09:00) (71 - 89)  ABP: --  ABP(mean): --  RR: 16 (16 Aug 2019 04:00) (16 - 18)  SpO2: 100% (16 Aug 2019 09:00) (97% - 100%)        08-15 @ 07:01 - 08-16 @ 07:00  --------------------------------------------------------  IN: 1362.2 mL / OUT: 0 mL / NET: 1362.2 mL    08-16 @ 07:01 - 08-16 @ 09:56  --------------------------------------------------------  IN: 100.5 mL / OUT: 0 mL / NET: 100.5 mL      CAPILLARY BLOOD GLUCOSE      POCT Blood Glucose.: 234 mg/dL (16 Aug 2019 09:30)      PHYSICAL EXAM:    General: NAD  HEENT: NC/AT; EOMI, PERRL, clear conjunctiva  Neck: supple  Respiratory: CTA b/l  Cardiovascular: +S1/S2; RRR; no murmurs, gallops, or rubs  Abdomen: Mild pain on palpation of RLQ and LLQ, patient reports pain in R lower back on palpation of RLQ; abdomen soft, nondistended; no rebound tenderness, no guarding, no Rovsing sign.   Extremities: WWP, 2+ peripheral pulses b/l; no LE edema  Skin: normal color and turgor; no rash  Neurological: Alert and oriented    MEDICATIONS:  MEDICATIONS  (STANDING):  chlorhexidine 2% Cloths 1 Application(s) Topical <User Schedule>  dextrose 10% + sodium chloride 0.45%. 1000 milliLiter(s) (100 mL/Hr) IV Continuous <Continuous>  dextrose 5%. 1000 milliLiter(s) (50 mL/Hr) IV Continuous <Continuous>  dextrose 50% Injectable 12.5 Gram(s) IV Push once  dextrose 50% Injectable 25 Gram(s) IV Push once  dextrose 50% Injectable 25 Gram(s) IV Push once  enoxaparin Injectable 30 milliGRAM(s) SubCutaneous daily  insulin regular Infusion 6 Unit(s)/Hr (6 mL/Hr) IV Continuous <Continuous>    MEDICATIONS  (PRN):  dextrose 40% Gel 15 Gram(s) Oral once PRN Blood Glucose LESS THAN 70 milliGRAM(s)/deciliter  glucagon  Injectable 1 milliGRAM(s) IntraMuscular once PRN Glucose LESS THAN 70 milligrams/deciliter  ondansetron Injectable 4 milliGRAM(s) IV Push every 6 hours PRN Nausea and/or Vomiting      ALLERGIES:  Allergies    penicillin (Hives)  penicillin (Rash)    Intolerances        LABS:                        11.6   12.39 )-----------( 246      ( 16 Aug 2019 02:41 )             36.0     08-16    138  |  107  |  14  ----------------------------<  189<H>  4.0   |  19<L>  |  0.53    Ca    8.0<L>      16 Aug 2019 06:36  Phos  3.1     08-16  Mg     1.8     08-16    TPro  6.6  /  Alb  3.8  /  TBili  0.6  /  DBili  x   /  AST  13  /  ALT  13  /  AlkPhos  57  08-16      Urinalysis Basic - ( 15 Aug 2019 18:30 )    Color: Yellow / Appearance: Clear / SG: >=1.030 / pH: x  Gluc: x / Ketone: >=80 mg/dL  / Bili: NEGATIVE / Urobili: 0.2 E.U./dL   Blood: x / Protein: NEGATIVE mg/dL / Nitrite: NEGATIVE   Leuk Esterase: NEGATIVE / RBC: < 5 /HPF / WBC < 5 /HPF   Sq Epi: x / Non Sq Epi: 0-5 /HPF / Bacteria: Present /HPF        RADIOLOGY & ADDITIONAL TESTS: Reviewed.

## 2019-08-16 NOTE — PROGRESS NOTE ADULT - PROBLEM SELECTOR PLAN 2
Diagnosed at age 7, on insulin pump on Novolog. States compliance is an issue.   - Follows up with endocrinologist, Dr. Pita Aldridge [phone]  - Last recorded HbA1c 10.8 (in March 2019, first episode of DKA); HbA1c on 8/16 9.3  - Poor glycemic control likely in the setting of recent health insurance issues Diagnosed at age 7, on insulin pump on Novolog. States compliance is an issue d/t insurance.  Last recorded HbA1c 10.8 (in March 2019, first episode of DKA. HbA1c 9.3 on admission.  - Follows up with endocrinologist, Dr. Pita Aldridge [phone]  - Poor glycemic control likely in the setting of recent health insurance issues

## 2019-08-16 NOTE — CONSULT NOTE ADULT - ATTENDING COMMENTS
Pt seen on rounds this afternoon.  Has a long history of type I DM, still under poor glycemic control as outpatient (A1c > 9%) despite resumption of pump therapy a few months ago.  The precipitating factor for her DKA is unclear--though she admits that her blood sugar was already up to 491 on the morning of admission.  Her history otherwise is unclear--the stories which she gives to different physicians seem to vary.  It appears that her basal insulin on the pump is insufficient given her report of fingersticks "200-300" but she also reports AM values in the 70 range on her current pump settings.  She likely has frequent post-prandial hyperglycemia from insufficient fingerstick monitoring during the day.  For now, will give 10 units of Lantus tonight (her previous dose before the pump) but only 3 units of lispro before meals

## 2019-08-16 NOTE — PROGRESS NOTE ADULT - ASSESSMENT
The patient is a 44 year old female with DM1 (diagnosed at age 7, on insulin pump) who was admitted to the MICU for management of DKA after presenting to the ED with 1 day of dizziness, nausea, and vomiting, and dull lower back pain of a few months duration. Insulin drip has been d/c and she is stable for [facility].     ENDOCRINE:  #DKA  -Initially presented with pH of 7.22, urine ketones 80, bicarb 15, AG 24, blood glucose > 400  -D10 1/2NS @ 100cc/hr ( at 3am)  - at 9:30am; fingersticks q1h  -AG closed x2 (bicarb 19 x2)  -F/u repeat BMP  -D/c IVF, start PO intake   -NPH now, lantus tonight  -Consult Endo RE restarting Novolog insulin pump (Medtonic Minimed) tomorrow    #Type 1 Diabetes Mellitus  - Diagnosed at age 7, on insulin pump on Novolog  - Follows up with endocrinologist, Dr. Pita Aldridge [phone]  - Last recorded HbA1c 10.8 (in March 2019, first episode of DKA); HbA1c on 8/16 9.3  - Poor glycemic control likely in the setting of recent health insurance issues    ID:  - Low suspicion for infectious process (pyelonephritis vs cholecystitis vs appendicitis)  - No dysuria, no fevers, no CVA tenderness, non-specific back tenderness, - Shepard's, +McBurney's point tenderness but patient describes pain as chronic   - Mildly elevated WBC 12.39 on 8/16  - UA with bacteria but negative for leuk esterase or nitrites  - CT on 8/15: Cholelithiasis, but no biliary ductal dilatation, nonobstructing stone within lower pole of L kidney, no R renal stone, no hydronephrosis, 3.2cm L ovarian cyst  - No abx for now    RENAL:  #AG Metabolic Acidosis likely 2/2 DKA  - Treatment of DKA as above    #Nephrolithiasis   - CT Renal Stone Carter showing non obstructing stone within lower pole of L kidney, no R renal stone and no hydronephrosis    PULM:  - No active issues, able to protect airway and maintaining good O2 saturation on RA    CARDIOVASCULAR:  - No active issues, VSS The patient is a 44 year old female with DM1 (diagnosed at age 7, on insulin pump) who was admitted to the MICU for management of DKA after presenting to the ED with 1 day of dizziness, nausea, and vomiting, and dull lower back pain of a few months duration. Insulin drip has been d/c and she is stable for transfer to [facility].     ENDOCRINE:  #DKA  -Initially presented with pH of 7.22, urine ketones 80, bicarb 15, AG 24, blood glucose > 400  -D10 1/2NS @ 100cc/hr ( at 3am)  - at 9:30am; fingersticks q1h  -AG closed x2 (bicarb 19 x2); d/c insulin drip  -F/u repeat BMP  -Start PO intake   -NPH now, lantus tonight  -Consult Endo RE restarting Novolog insulin pump (Medtonic Minimed) tomorrow    #Type 1 Diabetes Mellitus  - Diagnosed at age 7, on insulin pump on Novolog  - Follows up with endocrinologist, Dr. Pita Aldridge [phone]  - Last recorded HbA1c 10.8 (in March 2019, first episode of DKA); HbA1c on 8/16 9.3  - Poor glycemic control likely in the setting of recent health insurance issues    ID:  - Low suspicion for infectious process (pyelonephritis vs cholecystitis vs appendicitis)  - No dysuria, no fevers, no CVA tenderness, non-specific back tenderness, - Shepard's, +McBurney's point tenderness but patient describes pain as chronic   - Mildly elevated WBC 12.39 on 8/16  - UA with bacteria but negative for leuk esterase or nitrites  - CT on 8/15: Cholelithiasis, but no biliary ductal dilatation, nonobstructing stone within lower pole of L kidney, no R renal stone, no hydronephrosis, 3.2cm L ovarian cyst  - No abx for now    RENAL:  #AG Metabolic Acidosis likely 2/2 DKA  - Treatment of DKA as above    #Nephrolithiasis   - CT Renal Stone Carter showing non obstructing stone within lower pole of L kidney, no R renal stone and no hydronephrosis    PULM:  - No active issues, able to protect airway and maintaining good O2 saturation on RA    CARDIOVASCULAR:  - No active issues, VSS    F:  D10 1/2NS @ 100cc/hr. The patient is a 44 year old female with DM1 (diagnosed at age 7, on insulin pump) who presented with acute nausea/vomiting/dizziness, admitted to MICU for DKA management, now off Insulin drip with normal anion gap; HD stable and ready for transfer to [facility].     ENDOCRINE:  #DKA  -Initially presented with pH of 7.22, urine ketones 80, bicarb 15, AG 24, blood glucose > 400  -D10 1/2NS @ 100cc/hr ( at 3am)  - at 9:30am; fingersticks q1h  -AG closed x2 (bicarb 19 x2); d/c insulin drip  -F/u repeat BMP  -Start PO intake   -NPH at 815am, lantus tonight  -Consult Endo RE restarting Novolog insulin pump (Medtonic Minimed) tomorrow    #Type 1 Diabetes Mellitus  - Follows up with endocrinologist, Dr. Pita Aldridge [phone]  - Last recorded HbA1c 10.8 (in March 2019, first episode of DKA); HbA1c on 8/16 9.3  - Poor glycemic control likely in the setting of recent health insurance issues    ID:  - Low suspicion for infectious process (pyelonephritis vs cholecystitis vs appendicitis)  - No dysuria, no fevers, no CVA tenderness, non-specific back tenderness, - Shepard's, +McBurney's point tenderness but patient describes pain as chronic   - Mildly elevated WBC 12.39 on 8/16  - UA with bacteria but negative for leuk esterase or nitrites  - CT on 8/15: Cholelithiasis, but no biliary ductal dilatation, nonobstructing stone within lower pole of L kidney, no R renal stone, no hydronephrosis, 3.2cm L ovarian cyst  - No abx for now    RENAL:  #AG Metabolic Acidosis likely 2/2 DKA  - Treatment of DKA as above    #Nephrolithiasis   - CT Renal Stone Carter showing non obstructing stone within lower pole of L kidney, no R renal stone and no hydronephrosis    PULM:  - No active issues, able to protect airway and maintaining good O2 saturation on RA    CARDIOVASCULAR:  - No active issues, VSS    F: post dka protocol, pt tolerating PO, fluids ad libitum  E: replete as needed  D: consistent carb  Ppx: lovenox  Dispo: to [facility]

## 2019-08-16 NOTE — DIETITIAN INITIAL EVALUATION ADULT. - OTHER INFO
45 yo/female with PMHx T1DM on insulin pump, presented with dizziness and nausea, found to be in DKA w/AGMA. Pt seen in room and discussed during MICU rounds. She endorses good appetite at home, but has inconsistent eating d/t her work. Usually eats chicken, green vegetables, pears, apples. When asked about foods high in CHO that may be contributing to elevated sugars, she denied any. She has not been checking her BG levels as frequently d/t difficulty w/insurance covering test strips. Sugars sometimes run 200s during day. Pt was NPO at time of visit, but since has had insulin gtt shut off and Consistent Carbohydrate Diet started. No further complaints of N/V/C/D. Last BM 2 days ago. NKFA. No difficulty chewing or swallowing. Skin intact pressure-wise. Some lower abdominal pain reported, chronic, which MDs aware of. DM diet ed provided and reviewed. Discussed importance of eating more regularly as well.

## 2019-08-16 NOTE — DISCHARGE NOTE PROVIDER - HOSPITAL COURSE
Patient is a 44 year old female with a hx of IDDM (diagnosed age 7, on insulin pump)    Presented with dizziness and nausea, found to have DKA        Problem List/Main Diagnoses (system-based):     DKA - Glucose >400 on admission, metabolic acidosis w/ pH 7.22, urine ketones 80, bicarb 15, AG 24. Likely 2/2 poor compliance in setting of insurance issues. No source of infection identified. Treated with insulin gtt and D10 1/2NS @ 100cc/hr. AG closed to 12 on 8/16 am. Received NPH and started on ISS. Gtt discontinued and diet advanced. Endocrinology was consulted.    IDDM - Two episodes of DKA in last year. On insulin pump on Novolog. Patient has had insurance issues likely disrupting her compliance. A1c was 9.3 on admission, 10.8 in 3/2019.    Nephrolithiasis - CT scan showed a nonubstructing stone within the lower pole of the L kidney.         Inpatient treatment course:     Patient is 43 y/o female with hx of IDDM on insulin pump who was admitted with DKA. Metabolic acidosis with pH 7.22 on admission. Treated with insulin gtt and D10 1/2NS @ 100cc/hr. AG closed to 12 on 8/16 am. No clear source of infection was identified. Received NPH and started on ISS. Gtt discontinued and diet advanced. Endocrinology was consulted.        New medications:     Labs to be followed outpatient:     Exam to be followed outpatient: Patient is a 44 year old female with a hx of IDDM (diagnosed age 7, on insulin pump)    Presented with dizziness and nausea, found to have DKA        Problem List/Main Diagnoses (system-based):     DKA - Glucose >400 on admission, metabolic acidosis w/ pH 7.22, urine ketones 80, bicarb 15, AG 24. Likely 2/2 poor compliance in setting of insurance issues. No source of infection identified. Treated with insulin gtt and D10 1/2NS @ 100cc/hr. AG closed to 12 on 8/16 am. Received NPH and started on ISS. Gtt discontinued and diet advanced. Endocrinology was consulted - recommended 10u Lantus qhs, low dose ISS, and 3u premeal.    IDDM - Two episodes of DKA in last year. On insulin pump on Novolog. Patient has had insurance issues likely disrupting her compliance. A1c was 9.3 on admission, 10.8 in 3/2019.    Nephrolithiasis - CT scan showed a nonubstructing stone within the lower pole of the L kidney.         Inpatient treatment course:     Patient is 43 y/o female with hx of IDDM on insulin pump who was admitted with DKA. Metabolic acidosis with pH 7.22 on admission. Treated with insulin gtt and D10 1/2NS @ 100cc/hr. AG closed to 12 on 8/16 am. No clear source of infection was identified. Received NPH and started on ISS. Gtt discontinued and diet advanced. Endocrinology was consulted - recommended 10u Lantus qhs, low dose ISS, and 3u premeal.        New medications:     Labs to be followed outpatient:     Exam to be followed outpatient:

## 2019-08-16 NOTE — PROGRESS NOTE ADULT - PROBLEM SELECTOR PLAN 4
- CT Renal Stone Carter showing non obstructing stone within lower pole of L kidney, no R renal stone and no hydronephrosis CT on 8/15: Cholelithiasis, but no biliary ductal dilatation, nonobstructing stone within lower pole of L kidney, no R renal stone, no hydronephrosis, 3.2cm L ovarian cyst.

## 2019-08-16 NOTE — CONSULT NOTE ADULT - SUBJECTIVE AND OBJECTIVE BOX
Request for consultation: DKA, insulin pump management  Requested by:    Patient is a 44y old  Female who presents with a chief complaint of DKA (16 Aug 2019 11:55)    HPI:  44F with T1DM (diagnosed at age 7, on an insulin pump) was in her usual state of health at work when she started feeling dizzy and nauseous throughout the day. She had 3 episodes of NBNB vomiting, and was brought into the ED by her friend. She endorses back pain which she describes as constant and dull and has been present for a few months. She says the pain is worse with motion and sometimes improves with Tylenol or ibuprofen. She had been worked up for a kidney stone in the past, but she says her current pain feels different when compared to that incident. While she endorses occasional chills, she denies fevers, arthralgias, dyspnea, CP, bloating, diarrhea, constipation, dysuria, weight changes, recent travel or sick contacts. Her last meal was lunch on .    The patient was also hospitalized and treated for DKA about 5 months ago. At that episode, she had also presented with nausea and vomiting. Prior to that first episode of DKA, the patient has had no adverse events and had been well controlled with fingersticks in the 120-150s. The patient does report that these last few months her insurance coverage has changed and does not "pay for all the test trips" so the frequency with which she checks her BG has dropped to 2-3 times daily, with BG ranging from 200-300s.     In the ED, patient was afebrile Tmax 98.3 with sinus tachycardia to 110, /77. Labs were notable for pH of 7.22, urine ketones 80, bicarb 15, AG 24, blood glucose > 400.  The patient was transferred to the MICU on an insulin drip, for further management of DKA. (15 Aug 2019 23:07)    Patient has been on medtronic insulin pump for the last 4 months (since her last DKA admission). Admits to poor glycemic control, but believes that inconsistent insurance coverage and inadequate insulin doses are the culprit and not her diet or exercise. Prior to insulin pump, patient was managing her blood sugars with TID humalog 10 Units. Patient would also self correct for hyperglycemia by administering additional insulin. Denies having any basal coverage with lantus or levemir during this period. Patient was on this therapy for three years, but for the 15 years prior to that she was managed on the insulin pump but stopped due to aforementioned insurance issues. Besides the two episodes this year, patient denies other instances of DKA.     Pump settings:  Basal rate: 1 unit/hr  ICR: 1:15  Goal: 100-120      FAMILY HISTORY:  FH: heart disease (Father)  FH: HTN (hypertension) (Father, Mother)    PAST MEDICAL & SURGICAL HISTORY:  Insulin dependent type 1 diabetes mellitus  H/O:     Birth History:  Developmental History:    Allergies    penicillin (Hives)  penicillin (Rash)    Intolerances      MEDICATIONS  (STANDING):  dextrose 5%. 1000 milliLiter(s) (50 mL/Hr) IV Continuous <Continuous>  dextrose 50% Injectable 12.5 Gram(s) IV Push once  dextrose 50% Injectable 25 Gram(s) IV Push once  dextrose 50% Injectable 25 Gram(s) IV Push once  insulin glargine Injectable (LANTUS) 10 Unit(s) SubCutaneous at bedtime  insulin lispro (HumaLOG) corrective regimen sliding scale   SubCutaneous Before meals and at bedtime  insulin regular Infusion 6 Unit(s)/Hr (6 mL/Hr) IV Continuous <Continuous>    MEDICATIONS  (PRN):  dextrose 40% Gel 15 Gram(s) Oral once PRN Blood Glucose LESS THAN 70 milliGRAM(s)/deciliter  glucagon  Injectable 1 milliGRAM(s) IntraMuscular once PRN Glucose LESS THAN 70 milligrams/deciliter  ondansetron Injectable 4 milliGRAM(s) IV Push every 6 hours PRN Nausea and/or Vomiting    ROS as per HPI, otherwise negative    PHYSICAL EXAM:    Constitutional: WDWN resting comfortably in bed; NAD  Head: NC/AT  Eyes: PERRL, EOMI, anicteric sclera  ENT: no nasal discharge; uvula midline, no oropharyngeal erythema or exudates; MMM  Neck: supple; no JVD or thyromegaly  Respiratory: CTA B/L; no W/R/R, no retractions  Cardiac: +S1/S2; RRR; no M/R/G; PMI non-displaced  Gastrointestinal: soft, NT/ND; no rebound or guarding; +BSx4  Genitourinary: normal external genitalia  Back: spine midline, no bony tenderness or step-offs; no CVAT B/L  Extremities: WWP, no clubbing or cyanosis; no peripheral edema  Musculoskeletal: NROM x4; no joint swelling, tenderness or erythema  Vascular: 2+ radial, femoral, DP/PT pulses B/L  Dermatologic: skin warm, dry and intact; no rashes, wounds, or scars  Lymphatic: no submandibular or cervical LAD  Neurologic: AAOx3; CNII-XII grossly intact; no focal deficits  Psychiatric: affect and characteristics of appearance, verbalizations, behaviors are appropriate    Vital Signs Last 24 Hrs  T(C): 36.1 (16 Aug 2019 09:41), Max: 37.4 (15 Aug 2019 20:24)  T(F): 97 (16 Aug 2019 09:41), Max: 99.3 (15 Aug 2019 20:24)  HR: 78 (16 Aug 2019 12:00) (76 - 110)  BP: 124/65 (16 Aug 2019 12:00) (105/54 - 148/70)  BP(mean): 77 (16 Aug 2019 12:00) (71 - 89)  RR: 16 (16 Aug 2019 04:00) (16 - 18)  SpO2: 99% (16 Aug 2019 12:00) (97% - 100%)  Height (cm): 160 ( @ 10:12)  Weight (kg): 60 (08-15 @ 19:41)  BMI (kg/m2): 23.4 ( @ 10:12)      LABS  VBG - ( 15 Aug 2019 18:31 )  pH: 7.22  /  pCO2: 41    /  pO2: 29    / HCO3: x     / Base Excess: x     /  SvO2: 46    / Lactate: x                              11.6   12.39 )-----------( 246      ( 16 Aug 2019 02:41 )             36.0         138  |  107  |  14  ----------------------------<  189<H>  4.0   |  19<L>  |  0.53    Ca    8.0<L>      16 Aug 2019 06:36  Phos  3.1       Mg     1.8         TPro  6.6  /  Alb  3.8  /  TBili  0.6  /  DBili  x   /  AST  13  /  ALT  13  /  AlkPhos  57      Hemoglobin A1C, Whole Blood: 9.3 % ( @ 02:41)    Ketone - Urine: >=80 mg/dL (08-15 @ 18:30)    CAPILLARY BLOOD GLUCOSE      POCT Blood Glucose.: 117 mg/dL (16 Aug 2019 11:03)  POCT Blood Glucose.: 234 mg/dL (16 Aug 2019 09:30)  POCT Blood Glucose.: 187 mg/dL (16 Aug 2019 07:13)  POCT Blood Glucose.: 137 mg/dL (16 Aug 2019 06:12)  POCT Blood Glucose.: 123 mg/dL (16 Aug 2019 05:03)  POCT Blood Glucose.: 133 mg/dL (16 Aug 2019 04:03)  POCT Blood Glucose.: 131 mg/dL (16 Aug 2019 03:21)  POCT Blood Glucose.: 151 mg/dL (16 Aug 2019 02:16)  POCT Blood Glucose.: 142 mg/dL (16 Aug 2019 01:25)  POCT Blood Glucose.: 160 mg/dL (15 Aug 2019 23:54)  POCT Blood Glucose.: 253 mg/dL (15 Aug 2019 21:52)  POCT Blood Glucose.: 418 mg/dL (15 Aug 2019 20:03)  POCT Blood Glucose.: 394 mg/dL (15 Aug 2019 18:50)  POCT Blood Glucose.: 384 mg/dL (15 Aug 2019 17:44) Request for consultation: DKA, insulin pump management  Requested by:    Patient is a 44y old  Female who presents with a chief complaint of DKA (16 Aug 2019 11:55)    HPI:  44F with T1DM (diagnosed at age 7, on an insulin pump) was in her usual state of health at work when she started feeling dizzy and nauseous throughout the day. She had 3 episodes of NBNB vomiting, and was brought into the ED by her friend. She endorses back pain which she describes as constant and dull and has been present for a few months. She says the pain is worse with motion and sometimes improves with Tylenol or ibuprofen. She had been worked up for a kidney stone in the past, but she says her current pain feels different when compared to that incident. While she endorses occasional chills, she denies fevers, arthralgias, dyspnea, CP, bloating, diarrhea, constipation, dysuria, weight changes, recent travel or sick contacts. Her last meal was lunch on .    The patient was also hospitalized and treated for DKA about 5 months ago. At that episode, she had also presented with nausea and vomiting. Prior to that first episode of DKA, the patient has had no adverse events and had been well controlled with fingersticks in the 120-150s. The patient does report that these last few months her insurance coverage has changed and does not "pay for all the test trips" so the frequency with which she checks her BG has dropped to 2-3 times daily, with BG ranging from 200-300s.     In the ED, patient was afebrile Tmax 98.3 with sinus tachycardia to 110, /77. Labs were notable for pH of 7.22, urine ketones 80, bicarb 15, AG 24, blood glucose > 400.  The patient was transferred to the MICU on an insulin drip, for further management of DKA. (15 Aug 2019 23:07)    Patient has been on medtronic insulin pump for the last 4 months (since her last DKA admission). Admits to poor glycemic control, but believes that inconsistent insurance coverage and inadequate insulin doses are the culprit and not her diet or exercise. Prior to insulin pump, patient was managing her blood sugars with TID humalog 10 Units and lantus 10U at night time. Patient would also self correct for hyperglycemia by administering additional insulin. Patient was on this therapy for three years, but for the 15 years prior to that she was managed on the insulin pump but stopped due to aforementioned insurance issues. Besides the two episodes this year, patient denies other instances of DKA. Patient admits to having morning episodes of hypoglycemia, secondary to insulin boluses at bedtime.     Pump settings:  Basal rate: .35 unit/hr  ICR: 15:1  Goal: 100-120  Sensitivity factor: 45      FAMILY HISTORY:  FH: heart disease (Father)  FH: HTN (hypertension) (Father, Mother)    PAST MEDICAL & SURGICAL HISTORY:  Insulin dependent type 1 diabetes mellitus  H/O:     Birth History:  Developmental History:    Allergies    penicillin (Hives)  penicillin (Rash)    Intolerances      MEDICATIONS  (STANDING):  dextrose 5%. 1000 milliLiter(s) (50 mL/Hr) IV Continuous <Continuous>  dextrose 50% Injectable 12.5 Gram(s) IV Push once  dextrose 50% Injectable 25 Gram(s) IV Push once  dextrose 50% Injectable 25 Gram(s) IV Push once  insulin glargine Injectable (LANTUS) 10 Unit(s) SubCutaneous at bedtime  insulin lispro (HumaLOG) corrective regimen sliding scale   SubCutaneous Before meals and at bedtime  insulin regular Infusion 6 Unit(s)/Hr (6 mL/Hr) IV Continuous <Continuous>    MEDICATIONS  (PRN):  dextrose 40% Gel 15 Gram(s) Oral once PRN Blood Glucose LESS THAN 70 milliGRAM(s)/deciliter  glucagon  Injectable 1 milliGRAM(s) IntraMuscular once PRN Glucose LESS THAN 70 milligrams/deciliter  ondansetron Injectable 4 milliGRAM(s) IV Push every 6 hours PRN Nausea and/or Vomiting    ROS as per HPI, otherwise negative    PHYSICAL EXAM:    Constitutional: WDWN resting comfortably in bed; NAD  Head: NC/AT  Eyes: PERRL, EOMI, anicteric sclera  ENT: no nasal discharge; uvula midline, no oropharyngeal erythema or exudates; MMM  Neck: supple; no JVD or thyromegaly  Respiratory: CTA B/L; no W/R/R, no retractions  Cardiac: +S1/S2; RRR; no M/R/G; PMI non-displaced  Gastrointestinal: soft, NT/ND; no rebound or guarding; +BSx4  Genitourinary: normal external genitalia  Back: spine midline, no bony tenderness or step-offs; no CVAT B/L  Extremities: WWP, no clubbing or cyanosis; no peripheral edema  Musculoskeletal: NROM x4; no joint swelling, tenderness or erythema  Vascular: 2+ radial, femoral, DP/PT pulses B/L  Dermatologic: skin warm, dry and intact; no rashes, wounds, or scars  Lymphatic: no submandibular or cervical LAD  Neurologic: AAOx3; CNII-XII grossly intact; no focal deficits  Psychiatric: affect and characteristics of appearance, verbalizations, behaviors are appropriate    Vital Signs Last 24 Hrs  T(C): 36.1 (16 Aug 2019 09:41), Max: 37.4 (15 Aug 2019 20:24)  T(F): 97 (16 Aug 2019 09:41), Max: 99.3 (15 Aug 2019 20:24)  HR: 78 (16 Aug 2019 12:00) (76 - 110)  BP: 124/65 (16 Aug 2019 12:00) (105/54 - 148/70)  BP(mean): 77 (16 Aug 2019 12:00) (71 - 89)  RR: 16 (16 Aug 2019 04:00) (16 - 18)  SpO2: 99% (16 Aug 2019 12:00) (97% - 100%)  Height (cm): 160 ( @ 10:12)  Weight (kg): 60 (08-15 @ 19:41)  BMI (kg/m2): 23.4 ( @ 10:12)      LABS  VBG - ( 15 Aug 2019 18:31 )  pH: 7.22  /  pCO2: 41    /  pO2: 29    / HCO3: x     / Base Excess: x     /  SvO2: 46    / Lactate: x                              11.6   12.39 )-----------( 246      ( 16 Aug 2019 02:41 )             36.0         138  |  107  |  14  ----------------------------<  189<H>  4.0   |  19<L>  |  0.53    Ca    8.0<L>      16 Aug 2019 06:36  Phos  3.1     -  Mg     1.8         TPro  6.6  /  Alb  3.8  /  TBili  0.6  /  DBili  x   /  AST  13  /  ALT  13  /  AlkPhos  57      Hemoglobin A1C, Whole Blood: 9.3 % ( @ 02:41)    Ketone - Urine: >=80 mg/dL (08-15 @ 18:30)    CAPILLARY BLOOD GLUCOSE      POCT Blood Glucose.: 117 mg/dL (16 Aug 2019 11:03)  POCT Blood Glucose.: 234 mg/dL (16 Aug 2019 09:30)  POCT Blood Glucose.: 187 mg/dL (16 Aug 2019 07:13)  POCT Blood Glucose.: 137 mg/dL (16 Aug 2019 06:12)  POCT Blood Glucose.: 123 mg/dL (16 Aug 2019 05:03)  POCT Blood Glucose.: 133 mg/dL (16 Aug 2019 04:03)  POCT Blood Glucose.: 131 mg/dL (16 Aug 2019 03:21)  POCT Blood Glucose.: 151 mg/dL (16 Aug 2019 02:16)  POCT Blood Glucose.: 142 mg/dL (16 Aug 2019 01:25)  POCT Blood Glucose.: 160 mg/dL (15 Aug 2019 23:54)  POCT Blood Glucose.: 253 mg/dL (15 Aug 2019 21:52)  POCT Blood Glucose.: 418 mg/dL (15 Aug 2019 20:03)  POCT Blood Glucose.: 394 mg/dL (15 Aug 2019 18:50)  POCT Blood Glucose.: 384 mg/dL (15 Aug 2019 17:44) Request for consultation: DKA, insulin pump management    Patient is a 44y old  Female who presents with a chief complaint of DKA (16 Aug 2019 11:55)    HPI:  44F with T1DM (diagnosed at age 7, on an insulin pump) was in her usual state of health at work when she started feeling dizzy and nauseous throughout the day. She had 3 episodes of NBNB vomiting, and was brought into the ED by her friend. She endorses back pain which she describes as constant and dull and has been present for a few months. She says the pain is worse with motion and sometimes improves with Tylenol or ibuprofen. She had been worked up for a kidney stone in the past, but she says her current pain feels different when compared to that incident. While she endorses occasional chills, she denies fevers, arthralgias, dyspnea, CP, bloating, diarrhea, constipation, dysuria, weight changes, recent travel or sick contacts. Her last meal was lunch on .    The patient was also hospitalized and treated for DKA about 5 months ago. At that episode, she had also presented with nausea and vomiting. Prior to that first episode of DKA, the patient has had no adverse events and had been well controlled with fingersticks in the 120-150s. The patient does report that these last few months her insurance coverage has changed and does not "pay for all the test trips" so the frequency with which she checks her BG has dropped to 2-3 times daily, with BG ranging from 200-300s.     In the ED, patient was afebrile Tmax 98.3 with sinus tachycardia to 110, /77. Labs were notable for pH of 7.22, urine ketones 80, bicarb 15, AG 24, blood glucose > 400.  The patient was transferred to the MICU on an insulin drip, for further management of DKA. (15 Aug 2019 23:07)    Patient has been on medtronic insulin pump for the last 4 months (since her last DKA admission). Admits to poor glycemic control, but believes that inconsistent insurance coverage and inadequate insulin doses are the culprit and not her diet or exercise. Prior to insulin pump, patient was managing her blood sugars with TID humalog 10 Units and lantus 10U at night time. Patient would also self correct for hyperglycemia by administering additional insulin. Patient was on this therapy for three years, but for the 15 years prior to that she was managed on the insulin pump but stopped due to aforementioned insurance issues. Besides the two episodes this year, patient denies other instances of DKA. Patient admits to having morning episodes of hypoglycemia, secondary to insulin boluses at bedtime.     Pump settings:  Basal rate: 0.35 unit/hr  ICR: 1:15  Goal: 100-120  Sensitivity factor: 45      FAMILY HISTORY:  FH: heart disease (Father)  FH: HTN (hypertension) (Father, Mother)    PAST MEDICAL & SURGICAL HISTORY:  Insulin dependent type 1 diabetes mellitus  H/O:     Birth History:  Developmental History:    Allergies    penicillin (Hives)  penicillin (Rash)    Intolerances      MEDICATIONS  (STANDING):  dextrose 5%. 1000 milliLiter(s) (50 mL/Hr) IV Continuous <Continuous>  dextrose 50% Injectable 12.5 Gram(s) IV Push once  dextrose 50% Injectable 25 Gram(s) IV Push once  dextrose 50% Injectable 25 Gram(s) IV Push once  insulin glargine Injectable (LANTUS) 10 Unit(s) SubCutaneous at bedtime  insulin lispro (HumaLOG) corrective regimen sliding scale   SubCutaneous Before meals and at bedtime  insulin regular Infusion 6 Unit(s)/Hr (6 mL/Hr) IV Continuous <Continuous>    MEDICATIONS  (PRN):  dextrose 40% Gel 15 Gram(s) Oral once PRN Blood Glucose LESS THAN 70 milliGRAM(s)/deciliter  glucagon  Injectable 1 milliGRAM(s) IntraMuscular once PRN Glucose LESS THAN 70 milligrams/deciliter  ondansetron Injectable 4 milliGRAM(s) IV Push every 6 hours PRN Nausea and/or Vomiting    ROS as per HPI, otherwise negative    PHYSICAL EXAM:    Constitutional: WDWN resting comfortably in bed; NAD  Head: NC/AT  Eyes: PERRL, EOMI, anicteric sclera  ENT: no nasal discharge; uvula midline, no oropharyngeal erythema or exudates; MMM  Neck: supple; no JVD or thyromegaly  Respiratory: CTA B/L; no W/R/R, no retractions  Cardiac: +S1/S2; RRR; no M/R/G; PMI non-displaced  Gastrointestinal: soft, NT/ND; no rebound or guarding; +BSx4  Genitourinary: normal external genitalia  Back: spine midline, no bony tenderness or step-offs; no CVAT B/L  Extremities: WWP, no clubbing or cyanosis; no peripheral edema  Musculoskeletal: NROM x4; no joint swelling, tenderness or erythema  Vascular: 2+ radial, femoral, DP/PT pulses B/L  Dermatologic: skin warm, dry and intact; no rashes, wounds, or scars  Lymphatic: no submandibular or cervical LAD  Neurologic: AAOx3; CNII-XII grossly intact; no focal deficits  Psychiatric: affect and characteristics of appearance, verbalizations, behaviors are appropriate    Vital Signs Last 24 Hrs  T(C): 36.1 (16 Aug 2019 09:41), Max: 37.4 (15 Aug 2019 20:24)  T(F): 97 (16 Aug 2019 09:41), Max: 99.3 (15 Aug 2019 20:24)  HR: 78 (16 Aug 2019 12:00) (76 - 110)  BP: 124/65 (16 Aug 2019 12:00) (105/54 - 148/70)  BP(mean): 77 (16 Aug 2019 12:00) (71 - 89)  RR: 16 (16 Aug 2019 04:00) (16 - 18)  SpO2: 99% (16 Aug 2019 12:00) (97% - 100%)  Height (cm): 160 ( @ 10:12)  Weight (kg): 60 (08-15 @ 19:41)  BMI (kg/m2): 23.4 ( @ 10:12)      LABS  VBG - ( 15 Aug 2019 18:31 )  pH: 7.22  /  pCO2: 41    /  pO2: 29    / HCO3: x     / Base Excess: x     /  SvO2: 46    / Lactate: x                              11.6   12.39 )-----------( 246      ( 16 Aug 2019 02:41 )             36.0     -    138  |  107  |  14  ----------------------------<  189<H>  4.0   |  19<L>  |  0.53    Ca    8.0<L>      16 Aug 2019 06:36  Phos  3.1     -  Mg     1.8     -    TPro  6.6  /  Alb  3.8  /  TBili  0.6  /  DBili  x   /  AST  13  /  ALT  13  /  AlkPhos  57      Hemoglobin A1C, Whole Blood: 9.3 % ( @ 02:41)    Ketone - Urine: >=80 mg/dL (08-15 @ 18:30)    CAPILLARY BLOOD GLUCOSE      POCT Blood Glucose.: 117 mg/dL (16 Aug 2019 11:03)  POCT Blood Glucose.: 234 mg/dL (16 Aug 2019 09:30)  POCT Blood Glucose.: 187 mg/dL (16 Aug 2019 07:13)  POCT Blood Glucose.: 137 mg/dL (16 Aug 2019 06:12)  POCT Blood Glucose.: 123 mg/dL (16 Aug 2019 05:03)  POCT Blood Glucose.: 133 mg/dL (16 Aug 2019 04:03)  POCT Blood Glucose.: 131 mg/dL (16 Aug 2019 03:21)  POCT Blood Glucose.: 151 mg/dL (16 Aug 2019 02:16)  POCT Blood Glucose.: 142 mg/dL (16 Aug 2019 01:25)  POCT Blood Glucose.: 160 mg/dL (15 Aug 2019 23:54)  POCT Blood Glucose.: 253 mg/dL (15 Aug 2019 21:52)  POCT Blood Glucose.: 418 mg/dL (15 Aug 2019 20:03)  POCT Blood Glucose.: 394 mg/dL (15 Aug 2019 18:50)  POCT Blood Glucose.: 384 mg/dL (15 Aug 2019 17:44)

## 2019-08-16 NOTE — CONSULT NOTE ADULT - ASSESSMENT
A: Patient is a 43 yo F with PMH of Type 1 DM managed on insulin pump who presented to [facility] ED in DKA, s/p IVF and insulin drip. She is now stable and being transferred from the ICU to the floors. Endocrinology consulted to help manage her insulin requirements as her pump is not being used while inpatient.     P:  Type 1 DM, poor glycemic control (HgbA1C 9.3)  - Patient not currently on pump as an inpatient  - A: Patient is a 43 yo F with PMH of Type 1 DM managed on insulin pump who presented to [facility] ED in DKA, s/p IVF and insulin drip. She is now stable and being transferred from the ICU to the floors. Endocrinology consulted to help manage her insulin requirements as her pump is not being used while inpatient.     P:  Type 1 DM, poor glycemic control (HgbA1C 9.3)  - Patient not currently on pump as an inpatient  - Please c/w lantus 10 Units at bedtime  - Please c/w MARVA  - Please continue with FSGs TID before meals and at bedtime  - Please start with 3 units lispro TID before meals     Patient seen and discussed w/ Dr. Rae  Plan discussed w/ primary team A: Patient is a 43 yo F with PMH of Type 1 DM managed on insulin pump who presented to [facility] ED in DKA, s/p IVF and insulin drip. She is now stable and being transferred from the ICU to the floors. Endocrinology consulted to help manage her insulin requirements as her pump is not being used while inpatient.     P:  Type 1 DM, poor glycemic control (HgbA1C 9.3)  - Patient is not currently on insulin pump  - She received 10 NPH at 800 Am today and bridged with insulin drip . Currently, she is off the insulin drip  - Please c/w lantus 10 Units at bedtime  - Please c/w Lispro low dose sliding scale   - Please continue with FSGs TID before meals and at bedtime  - Please start with 3 units lispro TID before meals     For discharge: TBD. Patient follows up with Dr.Kalpana Aldridge at [facility] as an OP. She can follow up with her as an OP.  Patient seen and discussed w/ Dr. Rae  Plan discussed w/ primary team

## 2019-08-16 NOTE — DIETITIAN INITIAL EVALUATION ADULT. - ENERGY NEEDS
Height 63"; .2#; #; 115%IBW  ActualBW used for calculations as pt between % of IBW. Needs estimated for maintenance in adults

## 2019-08-16 NOTE — PROGRESS NOTE ADULT - ASSESSMENT
A 44 year old female with DM1 (diagnosed at age 7, on insulin pump) who was admitted to the MICU for management of DKA after presenting to the ED with 1 day of dizziness, nausea, and vomiting, and dull lower back pain of a few months duration. Insulin drip has been d/c and she is stable for transfer to [facility].

## 2019-08-16 NOTE — DIETITIAN INITIAL EVALUATION ADULT. - PATIENT MEETS CRITERIA FOR MALNUTRITION
Chief Complaint:  Chief Complaint   Patient presents with   [facility] Follow Up     Follow up for hospital stay at [facility] x 3 days for diagnosis of Pneumonia    Rash     Complains of a \"very bad rash\" in my periarea, severe burning with urination     Admission:  7/20/2019  Discharge: 7/23/2019    History of Present Illness:  62F who presents for transition of care follow up after being evaluated in the ER and then admitted to the hospital for 3-days for onset of Acute HFpEF. She was contacted by the NN s/p evaluation and just coming to the office today. Her concerns today include a new rash in the perianal area and under her breast. Continued diarrhea with some stool leakage. We also reviewed her hospital course as outlined below. HOSPITAL COURSE  Ms. Javy Yap is a 62yo female with a PMH of CHF, asthma, CAD, hypercholesterolemia, DM with chronic nephropathy, HTN, sleep apnea, neuropathy, restless leg syndrome, and obesity was admitted into the Family Medicine Service from 7/20/2019 to 7/23/2019 for shortness of breath. During the course of this admission, the following conditions were addressed/managed;     Acute CHF Exacerbation 2/2 CAP: (improved) NYHA Class 3. BNP POA 4118. Trop <0.05 x 3, TSH 1.00, Mg 2.4, Phos 4.0. ECHO (7/21/2019) showed LV EF 61-65%, no regional wall motion abnormality; pericardial effusion measuring 22mm, no tamponade or compression. Cardio recommended close monitoring and OP repeat ECHO. Pt diuresed with bumex 1mg IV BID. Continued home Imdur 60mg daily and lopressor 50mg BID. Discontinued home micardis 80mg tablet. -start bumex 1mg daily  -start valsartan 160mg tablet daily  -start norvasc 5mg daily  -follow up with cardiology for repeat ECHO     CAP: (improved) CXR on admission showed new lingular and R middle lobe PNA. RVP negative, procalcitonin <0.1. Blood culture showed no growth so far. Urine culture showed no significant growth.  Given 2 doses of zosyn and 3 doses of azithromycin 500mg for a total of 3 days of treatment. Patient feeling a lot better on day of discharge.  -continue mucinex 600mg BID prn     Loose stools (stable) Pt complains of 4-5 loose stools per day that are not preceded by any urgency. She states this affects her life. Tried immodium and probiotic without success. -follow up with gastroenterology outpatient for further workup and management.     Acute Exacerbation of moderate persistent asthma (improved): likely 2/2 to PNA s/p 2 days of tx with Prednisone and Azithromycin. Continued home singulair, xyzal, and breo. Given Xopenex q4hrs RT. Patient's breathing improved throughout hospital course. -Continue home prednisone dose pack. -follow up with PCP to ensure continued improvement.     DM (chronic) w/ Nephropathy (improved), POA glucose 226. Glucose on discharge 185. HgA1C (7/21/2019) 6.8. Cr 1.24 POA (baseline ~1) Cr on discharge 1. 18.     HTN: (stable) /149 POA. BP on discharge 136/50. Continued home Imdur 60mg daily and lopressor 50mg BID. -start valsartan 160mg tablet daily  -start norvasc 5mg daily     CAD: (stable) Stent placement in Sept. 2018. Continued home clopidogrel 75 mg.     HLD: (stable) Lipid panel (7/21/2019): Total Cholesterol 235,Triglyceride 196, , HDL 58. Continued home atorvastatin 40mg daily.     Neuropathy: (stable) Continued home Cymbalta 60 mg.     Restless Leg Syndrome: (stable) Continued pramipexole.      Cataracts: (stable) Recent eye surgery in June. Continue Latanoprost, Tobramycin, and prednisolone eye drops.     Obesity Body mass index is 39.3 kg/m². -Encouraging lifestyle modifications and further follow up outpatient.       Reviewed PmHx, RxHx, FmHx, SocHx, AllgHx and updated and dated in the chart.     Patient Active Problem List    Diagnosis    Community acquired pneumonia of right middle lobe of lung ([address]    CAP (community acquired pneumonia)    Type 2 diabetes with nephropathy ([address]    Chest pain  Coronary artery disease involving native coronary artery of native heart with angina pectoris (HCC)    Status post coronary artery stent placement    Severe obesity (HCC)    Neuropathy of right lower extremity    Pericardial effusion(moderate-large) with mild cardiac tamponade--via echo 8/1/16    Acute on chronic diastolic CHF (congestive heart failure) (HCC)    Obstructive sleep apnea    TIA (transient ischemic attack)    Essential hypertension with goal blood pressure less than 130/85    DM type 2, uncontrolled, with neuropathy (HCC)    Asthma    Hyperlipidemia    Restless leg syndrome       Review of Systems - negative except as listed above in the HPI    Objective:     Vitals:    08/01/19 1127   BP: 108/50   Pulse: (!) 55   Resp: 20   Temp: 98 °F (36.7 °C)   TempSrc: Oral   SpO2: 96%   Weight: 206 lb 3.2 oz (93.5 kg)   Height: 5' 1\" (1.549 m)     Physical Examination:   General appearance - alert, well appearing, and in no distress and overweight  Chest - clear to auscultation, no wheezes, rales or rhonchi, symmetric air entry  Heart - normal rate, regular rhythm, normal S1, S2, no murmurs, rubs, clicks or gallops  Musculoskeletal - no joint tenderness, deformity or swelling  Extremities - peripheral pulses normal, no pedal edema, no clubbing or cyanosis  Skin - erythematous, flaking skin under breast and perianal area consistent with candidal intertrigo.      LABS    Results for orders placed or performed in visit on 08/01/19   AMB POC URINALYSIS DIP STICK MANUAL W/O MICRO   Result Value Ref Range    Color (UA POC) Yellow     Clarity (UA POC) Clear     Glucose (UA POC) Trace Negative    Bilirubin (UA POC) Negative Negative    Ketones (UA POC) Negative Negative    Specific gravity (UA POC) 1.010 1.001 - 1.035    Blood (UA POC) 1+ Negative    pH (UA POC) 5.5 4.6 - 8.0    Protein (UA POC) 2+ Negative    Urobilinogen (UA POC) 0.2 mg/dL 0.2 - 1    Nitrites (UA POC) Negative Negative    Leukocyte esterase (UA POC) 1+ Negative   AMB POC URINE, MICROALBUMIN, SEMIQUANT (3 RESULTS)   Result Value Ref Range    ALBUMIN, URINE  Negative mg/L    CREATININE, URINE POC 10 mg/dL    Microalbumin/creat ratio (POC) >300 <30 MG/G       Assessment/ Plan:   62F with HFpEF who presents after recent hospitalization for presumed pneumonia and HF as described above. She has lingering complaints of persistent soft stool. She was given a referral to see GI, but still convalescing since her hospitalization. The patient also has discomfort with urination due to skin breakdown. However, will send urine culture. Reviewed recent hospitalization. Discussed concerns in detail with patient. Diagnoses and all orders for this visit:    1. Hypertensive heart disease with diastolic heart failure (HCC)  -     isosorbide mononitrate ER (IMDUR) 60 mg CR tablet; Take 1 Tab by mouth every morning.  -     METABOLIC PANEL, COMPREHENSIVE  -     CBC WITH AUTOMATED DIFF  · Weigh yourself without clothing at the same time each day. Record your weight. Call your doctor if you have sudden weight gain, such as more than 2 to 3 pounds in a day or 5 pounds in a week. (Your doctor may suggest a different range of weight gain.) A sudden weight gain may mean that your heart failure is getting worse. · Keep a daily record of your symptoms. Write down any changes in how you feel, such as new shortness of breath, cough, or problems eating. Also record if your ankles are more swollen than usual and if you feel more tired than usual. Note anything that you ate or did that could have triggered these changes. 2. Pain with urination  -     AMB POC URINALYSIS DIP STICK MANUAL W/O MICRO  -     CULTURE, URINE    3. Rash of perineum  -     AMB POC URINALYSIS DIP STICK MANUAL W/O MICRO    4. Controlled type 2 diabetes mellitus with hyperglycemia, unspecified whether long term insulin use (HCC)  -     AMB POC URINE, MICROALBUMIN, SEMIQUANT (3 RESULTS)    5. Moderate persistent asthma without complication  -     levocetirizine (XYZAL) 5 mg tablet; TAKE 1 TABLET BY MOUTH IN THE EVENING    6. Candidal intertrigo  -     fluconazole (DIFLUCAN) 150 mg tablet; Take 1 Tab by mouth daily for 1 day. FDA advises cautious prescribing of oral fluconazole in pregnancy. -     nystatin (MYCOSTATIN) powder; Apply  to affected area four (4) times daily. I have discussed the diagnosis with the patient and the intended treatment plan as seen in the above orders. The patient has received an after-visit summary and questions were answered concerning future plans. Asked to return should symptoms worsen or not improve with treatment. Any pending labs and studies will be relayed to patient when they become available. Pt verbalizes understanding of plan of care and denies further questions or concerns at this time. Follow-up and Dispositions    · Return in about 1 month (around 9/1/2019), or if symptoms worsen or fail to improve. Viky Gordon MD      Patient Instructions          Avoiding Triggers With Heart Failure: Care Instructions  Your Care Instructions    Triggers are anything that make your heart failure flare up. A flare-up is also called \"sudden heart failure\" or \"acute heart failure. \" When you have a flare-up, fluid builds up in your lungs, and you have problems breathing. You might need to go to the hospital. By watching for changes in your condition and avoiding triggers, you can prevent heart failure flare-ups. Follow-up care is a key part of your treatment and safety. Be sure to make and go to all appointments, and call your doctor if you are having problems. It's also a good idea to know your test results and keep a list of the medicines you take. How can you care for yourself at home? Watch for changes in your weight and condition  · Weigh yourself without clothing at the same time each day. Record your weight.  Call your doctor if you have sudden weight gain, such as more than 2 to 3 pounds in a day or 5 pounds in a week. (Your doctor may suggest a different range of weight gain.) A sudden weight gain may mean that your heart failure is getting worse. · Keep a daily record of your symptoms. Write down any changes in how you feel, such as new shortness of breath, cough, or problems eating. Also record if your ankles are more swollen than usual and if you feel more tired than usual. Note anything that you ate or did that could have triggered these changes. Limit sodium  Sodium causes your body to hold on to extra water. This may cause your heart failure symptoms to get worse. People get most of their sodium from processed foods. Fast food and restaurant meals also tend to be very high in sodium. · Your doctor may suggest that you limit sodium to 2,000 milligrams (mg) a day or less. That is less than 1 teaspoon of salt a day, including all the salt you eat in cooking or in packaged foods. · Read food labels on cans and food packages. They tell you how much sodium you get in one serving. Check the serving size. If you eat more than one serving, you are getting more sodium. · Be aware that sodium can come in forms other than salt, including monosodium glutamate (MSG), sodium citrate, and sodium bicarbonate (baking soda). MSG is often added to Asian food. You can sometimes ask for food without MSG or salt. · Slowly reducing salt will help you adjust to the taste. Take the salt shaker off the table. · Flavor your food with garlic, lemon juice, onion, vinegar, herbs, and spices instead of salt. Do not use soy sauce, steak sauce, onion salt, garlic salt, mustard, or ketchup on your food, unless it is labeled \"low-sodium\" or \"low-salt. \"  · Make your own salad dressings, sauces, and ketchup without adding salt. · Use fresh or frozen ingredients, instead of canned ones, whenever you can. Choose low-sodium canned goods.   · Eat less processed food and food from no restaurants, including fast food. Exercise as directed  Moderate, regular exercise is very good for your heart. It improves your blood flow and helps control your weight. But too much exercise can stress your heart and cause a heart failure flare-up. · Check with your doctor before you start an exercise program.  · Walking is an easy way to get exercise. Start out slowly. Gradually increase the length and pace of your walk. Swimming, riding a bike, and using a treadmill are also good forms of exercise. · When you exercise, watch for signs that your heart is working too hard. You are pushing yourself too hard if you cannot talk while you are exercising. If you become short of breath or dizzy or have chest pain, stop, sit down, and rest.  · Do not exercise when you do not feel well. Take medicines correctly  · Take your medicines exactly as prescribed. Call your doctor if you think you are having a problem with your medicine. · Make a list of all the medicines you take. Include those prescribed to you by other doctors and any over-the-counter medicines, vitamins, or supplements you take. Take this list with you when you go to any doctor. · Take your medicines at the same time every day. It may help you to post a list of all the medicines you take every day and what time of day you take them. · Make taking your medicine as simple as you can. Plan times to take your medicines when you are doing other things, such as eating a meal or getting ready for bed. This will make it easier to remember to take your medicines. · Get organized. Use helpful tools, such as daily or weekly pill containers. When should you call for help?   Call 911 if you have symptoms of sudden heart failure such as:    · You have severe trouble breathing.     · You cough up pink, foamy mucus.     · You have a new irregular or rapid heartbeat.    Call your doctor now or seek immediate medical care if:    · You have new or increased shortness of breath.     · You are dizzy or lightheaded, or you feel like you may faint.     · You have sudden weight gain, such as more than 2 to 3 pounds in a day or 5 pounds in a week. (Your doctor may suggest a different range of weight gain.)     · You have increased swelling in your legs, ankles, or feet.     · You are suddenly so tired or weak that you cannot do your usual activities.    Watch closely for changes in your health, and be sure to contact your doctor if you develop new symptoms. Where can you learn more? Go to http://emily-janette.info/. Enter I730 in the search box to learn more about \"Avoiding Triggers With Heart Failure: Care Instructions. \"  Current as of: July 22, 2018  Content Version: 12.1  © 9890-7882 [employer]. Care instructions adapted under license by [employer] (which disclaims liability or warranty for this information). If you have questions about a medical condition or this instruction, always ask your healthcare professional. [address] any warranty or liability for your use of this information.

## 2019-08-16 NOTE — DISCHARGE NOTE PROVIDER - NSDCCPCAREPLAN_GEN_ALL_CORE_FT
PRINCIPAL DISCHARGE DIAGNOSIS  Diagnosis: Diabetic keto-acidosis  Assessment and Plan of Treatment: You were admitted to the hospital with dizziness and nausea and found to be in diabetic ketoacidosis. In the ED, you were started on an insulin drip and intravenous fluids. Your blood sugar was initially in the 400s but has come down with medical management. On the second day of hospitalization you were transitioned to subcutaneous insulin and NPH from the insulin drip. We advanced your diet. The endocrinology service was consulted regarding your insulin pump. The metabolic acidosis is resolved.      SECONDARY DISCHARGE DIAGNOSES  Diagnosis: Insulin dependent type 1 diabetes mellitus  Assessment and Plan of Treatment: On admission to the hospital, your hemoglobin A1c was 9.8 indicating poorly controlled diabetes. This is likely due to inadequate control with your current insulin pump. Please see your endocrinologist upon discharge to discuss management of your insulin dependent diabetes mellitus.    Diagnosis: Nephrolithiasis  Assessment and Plan of Treatment: You have a history of kidney stones. On admission, a CAT scan showed a nonobstructing kidney stone on the left side. If you experience severe abdominal pain radiating to your groin please seek medical attention. PRINCIPAL DISCHARGE DIAGNOSIS  Diagnosis: Diabetic keto-acidosis  Assessment and Plan of Treatment: You were admitted to the hospital with dizziness and nausea and found to be in diabetic ketoacidosis. In the ED, you were started on an insulin drip and intravenous fluids. Your blood sugar was initially in the 400s but has come down with medical management. On the second day of hospitalization you were transitioned to subcutaneous insulin and NPH from the insulin drip. We advanced your diet. The metabolic acidosis is resolved.      SECONDARY DISCHARGE DIAGNOSES  Diagnosis: Insulin dependent type 1 diabetes mellitus  Assessment and Plan of Treatment: On admission to the hospital, your hemoglobin A1c was 9.8 indicating poorly controlled diabetes. This is likely due to inadequate control with your current insulin pump. The endocrinology service was consulted regarding your insulin pump and did not restart you on it in the hospital. Instead, you were giving sliding scale insulin, long acting Lantus, and 3 units premeal. Please see your endocrinologist upon discharge to discuss management of your insulin dependent diabetes mellitus.    Diagnosis: Nephrolithiasis  Assessment and Plan of Treatment: You have a history of kidney stones. On admission, a CAT scan showed a nonobstructing kidney stone on the left side. If you experience severe abdominal pain radiating to your groin please seek medical attention.

## 2019-08-16 NOTE — PROGRESS NOTE ADULT - SUBJECTIVE AND OBJECTIVE BOX
PGY3 TRANSFER ACCEPTANCE NOTE    HOSPITAL COURSE:  A 45 yo F w/DM I (diagnosed at age 7, on an insulin pump) w/ recent admission for DKA last 3/2019 at [place] presented to [facility] complaining of dizziness and nausea. In ED patient was afebrile with Tmax 98.3 with sinus tachycardia to 110, /77. Labs were notable for pH of 7.22, urine ketones 80, bicarb 15, AG 24, blood glucose > 400 c/w DKA. She was started on an insulin gtt and transferred to [facility] MICU for further management.   On arrival to [facility] MICU patient reported feeling fatigued but significantly improved from initial presentation. She denies any infectious symptoms such as rhinorrhea, headache, throat pain, cough, diarrhea, dysuria, fevers or chills. She has been monitoring her FS less frequently due to insurance not paying for her test strips. She still has the insulin pump which was removed in the ED. VS on arrival to [facility] MICU: T 98.7, , /58, RR 18, O2%19. Finger stick on arrival was 253.   In the MICU she was treated per DKA protocol, on insulin gtt @ 6U/hr and D5 1/2NS with 50meq bicarb @ 150cc/hr. Fingerstick 131 at 3:21 AM, IVF changed to D10 1/2NS @ 100cc/hr. Most recent fingerstick 234 at 9:30AM. Anion gap closed x2 (AG 19 x2 this morning). Insulin drip d/c. The patient is hemodynamically stable and is ready for transfer     SUBJECTIVE: Patient seen and examined at bedside. She reports feeling better than yesterday; denies nausea, vomiting, dizziness. She admits to pain in her lower abdomen bilaterally which she characterizes as "connected" to her back pain.          OVERNIGHT EVENTS:    SUBJECTIVE:    Vital Signs Last 12 Hrs  T(F): 97 (08-16-19 @ 09:41), Max: 98.5 (08-16-19 @ 01:34)  HR: 76 (08-16-19 @ 11:00) (76 - 90)  BP: 134/72 (08-16-19 @ 11:00) (105/54 - 134/72)  BP(mean): 88 (08-16-19 @ 11:00) (73 - 89)  RR: 16 (08-16-19 @ 04:00) (16 - 16)  SpO2: 99% (08-16-19 @ 11:00) (98% - 100%)  I&O's Summary    15 Aug 2019 07:01  -  16 Aug 2019 07:00  --------------------------------------------------------  IN: 1362.2 mL / OUT: 0 mL / NET: 1362.2 mL    16 Aug 2019 07:01  -  16 Aug 2019 11:55  --------------------------------------------------------  IN: 201 mL / OUT: 0 mL / NET: 201 mL        PHYSICAL EXAM:    Constitutional: NAD, AAOx3, comfortable in bed.   Respiratory: Normal rate, rhythm, depth, effort. CTAB. No w/r/r.   Cardiovascular: RRR, normal S1 and S2, no m/r/g.   Gastrointestinal: +BS, soft NTND.   Extremities: wwp, no edema.   Vascular: Pulses equal and strong throughout.   Neurological: Cranial nerves grossly intact, strength and sensation intact throughout.   Skin: No gross skin abnormalities.         LABS:                        11.6   12.39 )-----------( 246      ( 16 Aug 2019 02:41 )             36.0     08-16    138  |  107  |  14  ----------------------------<  189<H>  4.0   |  19<L>  |  0.53    Ca    8.0<L>      16 Aug 2019 06:36  Phos  3.1     08-16  Mg     1.8     08-16    TPro  6.6  /  Alb  3.8  /  TBili  0.6  /  DBili  x   /  AST  13  /  ALT  13  /  AlkPhos  57  08-16      Urinalysis Basic - ( 15 Aug 2019 18:30 )    Color: Yellow / Appearance: Clear / SG: >=1.030 / pH: x  Gluc: x / Ketone: >=80 mg/dL  / Bili: NEGATIVE / Urobili: 0.2 E.U./dL   Blood: x / Protein: NEGATIVE mg/dL / Nitrite: NEGATIVE   Leuk Esterase: NEGATIVE / RBC: < 5 /HPF / WBC < 5 /HPF   Sq Epi: x / Non Sq Epi: 0-5 /HPF / Bacteria: Present /HPF        RADIOLOGY & ADDITIONAL TESTS:    MEDICATIONS  (STANDING):  chlorhexidine 2% Cloths 1 Application(s) Topical <User Schedule>  dextrose 10% + sodium chloride 0.45%. 1000 milliLiter(s) (100 mL/Hr) IV Continuous <Continuous>  dextrose 5%. 1000 milliLiter(s) (50 mL/Hr) IV Continuous <Continuous>  dextrose 50% Injectable 12.5 Gram(s) IV Push once  dextrose 50% Injectable 25 Gram(s) IV Push once  dextrose 50% Injectable 25 Gram(s) IV Push once  insulin lispro (HumaLOG) corrective regimen sliding scale   SubCutaneous Before meals and at bedtime  insulin regular Infusion 6 Unit(s)/Hr (6 mL/Hr) IV Continuous <Continuous>    MEDICATIONS  (PRN):  dextrose 40% Gel 15 Gram(s) Oral once PRN Blood Glucose LESS THAN 70 milliGRAM(s)/deciliter  glucagon  Injectable 1 milliGRAM(s) IntraMuscular once PRN Glucose LESS THAN 70 milligrams/deciliter  ondansetron Injectable 4 milliGRAM(s) IV Push every 6 hours PRN Nausea and/or Vomiting PGY3 TRANSFER ACCEPTANCE NOTE    HOSPITAL COURSE:  A 43 yo F w/DM I (diagnosed at age 7, on an insulin pump) w/ recent admission for DKA last 3/2019 at [place] presented to [facility] complaining of dizziness and nausea. In ED, patient was afebrile with Tmax 98.3, sinus tachycardia to 110, /77. Labs were notable for pH of 7.22, urine ketones 80, bicarb 15, AG 24, blood glucose > 400 c/w DKA. She was started on an insulin gtt and transferred to [facility] MICU for further management. On arrival to [facility] MICU patient reported feeling fatigued but significantly improved from initial presentation. She denies any infectious symptoms such as rhinorrhea, headache, throat pain, cough, diarrhea, dysuria, fevers or chills. She has been monitoring her FS less frequently due to insurance not paying for her test strips. Her insulin pump was removed in the ED. VS on arrival to [facility] MICU: T 98.7, , /58, RR 18, O2%19. Finger stick on arrival was 253.   In the MICU she was treated per DKA protocol, on insulin gtt @ 6U/hr and D5 1/2NS with 50meq bicarb @ 150cc/hr. Fingerstick 131 at 3:21 AM, IVF changed to D10 1/2NS @ 100cc/hr. Most recent fingerstick 234 at 9:30AM. Anion gap closed x2 (AG 19 x2 this morning). Insulin drip d/c at 1018. Given 10U NPH at 0818. The patient is hemodynamically stable and is ready for transfer to the floor. Diet will be started.    SUBJECTIVE: Patient seen and examined at bedside. She reports feeling better than yesterday. Denies nausea, vomiting, dizziness. Endorsed chills yesterday but denies fever. No chest pain or shortness of breath. She has pain in her lower abdomen bilaterally which she characterizes as "connected" to her back pain and is chronic.        Vital Signs Last 12 Hrs  T(F): 97 (08-16-19 @ 09:41), Max: 98.5 (08-16-19 @ 01:34)  HR: 76 (08-16-19 @ 11:00) (76 - 90)  BP: 134/72 (08-16-19 @ 11:00) (105/54 - 134/72)  BP(mean): 88 (08-16-19 @ 11:00) (73 - 89)  RR: 16 (08-16-19 @ 04:00) (16 - 16)  SpO2: 99% (08-16-19 @ 11:00) (98% - 100%)  I&O's Summary    15 Aug 2019 07:01  -  16 Aug 2019 07:00  --------------------------------------------------------  IN: 1362.2 mL / OUT: 0 mL / NET: 1362.2 mL    16 Aug 2019 07:01  -  16 Aug 2019 11:55  --------------------------------------------------------  IN: 201 mL / OUT: 0 mL / NET: 201 mL        PHYSICAL EXAM:    Constitutional: NAD, AAOx3, comfortable in bed.   HEENT: MMM; PERRLA, EOMI, no pharyngeal erythema or exudate; uvula midline  Respiratory: Normal rate, rhythm, depth, effort. CTAB. No w/r/r.   Cardiovascular: RRR, normal S1 and S2, no m/r/g.   Gastrointestinal: +BS, soft, ND, TTP RLQ    Extremities: wwp, no edema.   Vascular: 2+ radial and DP pulses b/l  Neurological: Cranial nerves grossly intact, strength and sensation intact throughout.   Skin: No rash or bruising        LABS:                        11.6   12.39 )-----------( 246      ( 16 Aug 2019 02:41 )             36.0     08-16    138  |  107  |  14  ----------------------------<  189<H>  4.0   |  19<L>  |  0.53    Ca    8.0<L>      16 Aug 2019 06:36  Phos  3.1     08-16  Mg     1.8     08-16    TPro  6.6  /  Alb  3.8  /  TBili  0.6  /  DBili  x   /  AST  13  /  ALT  13  /  AlkPhos  57  08-16      Urinalysis Basic - ( 15 Aug 2019 18:30 )    Color: Yellow / Appearance: Clear / SG: >=1.030 / pH: x  Gluc: x / Ketone: >=80 mg/dL  / Bili: NEGATIVE / Urobili: 0.2 E.U./dL   Blood: x / Protein: NEGATIVE mg/dL / Nitrite: NEGATIVE   Leuk Esterase: NEGATIVE / RBC: < 5 /HPF / WBC < 5 /HPF   Sq Epi: x / Non Sq Epi: 0-5 /HPF / Bacteria: Present /HPF        RADIOLOGY & ADDITIONAL TESTS:    MEDICATIONS  (STANDING):  chlorhexidine 2% Cloths 1 Application(s) Topical <User Schedule>  dextrose 10% + sodium chloride 0.45%. 1000 milliLiter(s) (100 mL/Hr) IV Continuous <Continuous>  dextrose 5%. 1000 milliLiter(s) (50 mL/Hr) IV Continuous <Continuous>  dextrose 50% Injectable 12.5 Gram(s) IV Push once  dextrose 50% Injectable 25 Gram(s) IV Push once  dextrose 50% Injectable 25 Gram(s) IV Push once  insulin lispro (HumaLOG) corrective regimen sliding scale   SubCutaneous Before meals and at bedtime  insulin regular Infusion 6 Unit(s)/Hr (6 mL/Hr) IV Continuous <Continuous>    MEDICATIONS  (PRN):  dextrose 40% Gel 15 Gram(s) Oral once PRN Blood Glucose LESS THAN 70 milliGRAM(s)/deciliter  glucagon  Injectable 1 milliGRAM(s) IntraMuscular once PRN Glucose LESS THAN 70 milligrams/deciliter  ondansetron Injectable 4 milliGRAM(s) IV Push every 6 hours PRN Nausea and/or Vomiting

## 2019-08-16 NOTE — PROGRESS NOTE ADULT - PROBLEM SELECTOR PLAN 1
Presented to [facility] with metabolic acidosis: pH of 7.22, urine ketones 80, bicarb 15, AG 24, blood glucose > 400  - s/p Insulin gtt and D10 1/2NS @ 100cc/hr ( at 3am)  - AG closed x2 (bicarb 19 x2)  - F/u repeat BMP  - Advance Diet to Consistent Carb Diet  - Start Lantus 10 U bedtime tonight  - Endocrinology consulted, f/u recommendations regarding Novolog insulin pump (Medtonic Minimed) Presented to [facility] with blood glucose > 400, metabolic acidosis w/ pH 7.22, urine ketones 80, bicarb 15, AG 24. Now, s/p Insulin gtt and D10 1/2NS @ 100cc/hr ( at 3am). Received NPH 10U @ 0818.  - AG closed, 12 on last check  - F/u repeat BMP  - Advance diet to Consistent Carb Diet  - Will start Lantus 10 U bedtime tonight  - Endocrinology consulted, f/u recommendations regarding Novolog insulin pump (Medtonic Minimed)

## 2019-08-16 NOTE — PROGRESS NOTE ADULT - PROBLEM SELECTOR PLAN 3
- Low suspicion for infectious process at this time as fever workup negative (pyelonephritis vs cholecystitis vs appendicitis)  - No dysuria, no fevers, no CVA tenderness, non-specific back tenderness  - Mildly elevated WBC 12.39 on 8/16, but improving  - UA with bacteria but negative for leuk esterase or nitrites  - f/u urine cultures  - CT on 8/15: Cholelithiasis, but no biliary ductal dilatation, nonobstructing stone within lower pole of L kidney, no R renal stone, no hydronephrosis, 3.2cm L ovarian cyst  - As no clear source of infection, defer antibiotics at this time.   - Continue to monitor. - Low suspicion for infectious process at this time as fever workup negative (pyelonephritis vs cholecystitis vs appendicitis). No dysuria, no fevers, no CVA tenderness, non-specific back tenderness. Mildly elevated WBC 12.39 on 8/16, but improving. UA with bacteria but negative for leuk esterase or nitrites.  - f/u urine cultures  - As no clear source of infection, defer antibiotics at this time.   - Continue to monitor.

## 2019-08-16 NOTE — DIETITIAN INITIAL EVALUATION ADULT. - NAME AND PHONE
Samantha Gitlin, RD, CDN, [facility], [phone] or available on [employer]

## 2019-08-17 ENCOUNTER — TRANSCRIPTION ENCOUNTER (OUTPATIENT)
Age: 44
End: 2019-08-17

## 2019-08-17 VITALS
RESPIRATION RATE: 16 BRPM | DIASTOLIC BLOOD PRESSURE: 67 MMHG | HEART RATE: 74 BPM | TEMPERATURE: 98 F | SYSTOLIC BLOOD PRESSURE: 109 MMHG | OXYGEN SATURATION: 98 %

## 2019-08-17 LAB
ANION GAP SERPL CALC-SCNC: 11 MMOL/L — SIGNIFICANT CHANGE UP (ref 5–17)
BUN SERPL-MCNC: 15 MG/DL — SIGNIFICANT CHANGE UP (ref 7–23)
CALCIUM SERPL-MCNC: 8.9 MG/DL — SIGNIFICANT CHANGE UP (ref 8.4–10.5)
CHLORIDE SERPL-SCNC: 105 MMOL/L — SIGNIFICANT CHANGE UP (ref 96–108)
CO2 SERPL-SCNC: 24 MMOL/L — SIGNIFICANT CHANGE UP (ref 22–31)
CREAT SERPL-MCNC: 0.55 MG/DL — SIGNIFICANT CHANGE UP (ref 0.5–1.3)
GLUCOSE BLDC GLUCOMTR-MCNC: 163 MG/DL — HIGH (ref 70–99)
GLUCOSE BLDC GLUCOMTR-MCNC: 213 MG/DL — HIGH (ref 70–99)
GLUCOSE SERPL-MCNC: 182 MG/DL — HIGH (ref 70–99)
HCT VFR BLD CALC: 42 % — SIGNIFICANT CHANGE UP (ref 34.5–45)
HGB BLD-MCNC: 13.4 G/DL — SIGNIFICANT CHANGE UP (ref 11.5–15.5)
MAGNESIUM SERPL-MCNC: 1.5 MG/DL — LOW (ref 1.6–2.6)
MCHC RBC-ENTMCNC: 29.9 PG — SIGNIFICANT CHANGE UP (ref 27–34)
MCHC RBC-ENTMCNC: 31.9 GM/DL — LOW (ref 32–36)
MCV RBC AUTO: 93.8 FL — SIGNIFICANT CHANGE UP (ref 80–100)
NRBC # BLD: 0 /100 WBCS — SIGNIFICANT CHANGE UP (ref 0–0)
PLATELET # BLD AUTO: 255 K/UL — SIGNIFICANT CHANGE UP (ref 150–400)
POTASSIUM SERPL-MCNC: 4.1 MMOL/L — SIGNIFICANT CHANGE UP (ref 3.5–5.3)
POTASSIUM SERPL-SCNC: 4.1 MMOL/L — SIGNIFICANT CHANGE UP (ref 3.5–5.3)
RBC # BLD: 4.48 M/UL — SIGNIFICANT CHANGE UP (ref 3.8–5.2)
RBC # FLD: 12.9 % — SIGNIFICANT CHANGE UP (ref 10.3–14.5)
SODIUM SERPL-SCNC: 140 MMOL/L — SIGNIFICANT CHANGE UP (ref 135–145)
WBC # BLD: 8.9 K/UL — SIGNIFICANT CHANGE UP (ref 3.8–10.5)
WBC # FLD AUTO: 8.9 K/UL — SIGNIFICANT CHANGE UP (ref 3.8–10.5)

## 2019-08-17 PROCEDURE — 96374 THER/PROPH/DIAG INJ IV PUSH: CPT

## 2019-08-17 PROCEDURE — 80053 COMPREHEN METABOLIC PANEL: CPT

## 2019-08-17 PROCEDURE — 83605 ASSAY OF LACTIC ACID: CPT

## 2019-08-17 PROCEDURE — 96375 TX/PRO/DX INJ NEW DRUG ADDON: CPT

## 2019-08-17 PROCEDURE — 71046 X-RAY EXAM CHEST 2 VIEWS: CPT

## 2019-08-17 PROCEDURE — 83735 ASSAY OF MAGNESIUM: CPT

## 2019-08-17 PROCEDURE — 80048 BASIC METABOLIC PNL TOTAL CA: CPT

## 2019-08-17 PROCEDURE — 85025 COMPLETE CBC W/AUTO DIFF WBC: CPT

## 2019-08-17 PROCEDURE — 74176 CT ABD & PELVIS W/O CONTRAST: CPT

## 2019-08-17 PROCEDURE — 81025 URINE PREGNANCY TEST: CPT

## 2019-08-17 PROCEDURE — 93005 ELECTROCARDIOGRAM TRACING: CPT

## 2019-08-17 PROCEDURE — 85027 COMPLETE CBC AUTOMATED: CPT

## 2019-08-17 PROCEDURE — 36415 COLL VENOUS BLD VENIPUNCTURE: CPT

## 2019-08-17 PROCEDURE — 82803 BLOOD GASES ANY COMBINATION: CPT

## 2019-08-17 PROCEDURE — 83036 HEMOGLOBIN GLYCOSYLATED A1C: CPT

## 2019-08-17 PROCEDURE — 82962 GLUCOSE BLOOD TEST: CPT

## 2019-08-17 PROCEDURE — 99285 EMERGENCY DEPT VISIT HI MDM: CPT | Mod: 25

## 2019-08-17 PROCEDURE — 84100 ASSAY OF PHOSPHORUS: CPT

## 2019-08-17 PROCEDURE — 81001 URINALYSIS AUTO W/SCOPE: CPT

## 2019-08-17 PROCEDURE — 99239 HOSP IP/OBS DSCHRG MGMT >30: CPT

## 2019-08-17 RX ORDER — ENOXAPARIN SODIUM 100 MG/ML
10 INJECTION SUBCUTANEOUS
Qty: 3 | Refills: 0
Start: 2019-08-17 | End: 2019-09-15

## 2019-08-17 RX ORDER — MAGNESIUM SULFATE 500 MG/ML
2 VIAL (ML) INJECTION ONCE
Refills: 0 | Status: COMPLETED | OUTPATIENT
Start: 2019-08-17 | End: 2019-08-17

## 2019-08-17 RX ORDER — INSULIN LISPRO 100/ML
3 VIAL (ML) SUBCUTANEOUS
Qty: 1 | Refills: 0
Start: 2019-08-17 | End: 2019-09-15

## 2019-08-17 RX ADMIN — Medication 2: at 11:56

## 2019-08-17 RX ADMIN — Medication 3 UNIT(S): at 11:56

## 2019-08-17 RX ADMIN — Medication 50 GRAM(S): at 11:56

## 2019-08-17 RX ADMIN — Medication 1: at 09:09

## 2019-08-17 RX ADMIN — Medication 3 UNIT(S): at 09:09

## 2019-08-17 NOTE — DISCHARGE NOTE NURSING/CASE MANAGEMENT/SOCIAL WORK - NSDCDPATPORTLINK_GEN_ALL_CORE
You can access the [facility] Portal, offered by [facility], by registering with the following website: [email or web address]

## 2019-08-21 DIAGNOSIS — Z96.41 PRESENCE OF INSULIN PUMP (EXTERNAL) (INTERNAL): ICD-10-CM

## 2019-08-21 DIAGNOSIS — Z59.7 INSUFFICIENT SOCIAL INSURANCE AND WELFARE SUPPORT: ICD-10-CM

## 2019-08-21 DIAGNOSIS — E10.10 TYPE 1 DIABETES MELLITUS WITH KETOACIDOSIS WITHOUT COMA: ICD-10-CM

## 2019-08-21 DIAGNOSIS — Z88.0 ALLERGY STATUS TO PENICILLIN: ICD-10-CM

## 2019-08-21 SDOH — ECONOMIC STABILITY - INCOME SECURITY: INSUFFICIENT SOCIAL INSURANCE AND WELFARE SUPPORT: Z59.7

## 2020-07-28 PROBLEM — E10.9 TYPE 1 DIABETES MELLITUS WITHOUT COMPLICATIONS: Chronic | Status: ACTIVE | Noted: 2019-08-15

## 2020-09-10 DIAGNOSIS — Z82.49 FAMILY HISTORY OF ISCHEMIC HEART DISEASE AND OTHER DISEASES OF THE CIRCULATORY SYSTEM: ICD-10-CM

## 2020-09-10 DIAGNOSIS — F32.9 MAJOR DEPRESSIVE DISORDER, SINGLE EPISODE, UNSPECIFIED: ICD-10-CM

## 2020-09-10 DIAGNOSIS — Z63.5 DISRUPTION OF FAMILY BY SEPARATION AND DIVORCE: ICD-10-CM

## 2020-09-10 DIAGNOSIS — E10.65 TYPE 1 DIABETES MELLITUS WITH HYPERGLYCEMIA: ICD-10-CM

## 2020-09-10 DIAGNOSIS — Z83.42 FAMILY HISTORY OF FAMILIAL HYPERCHOLESTEROLEMIA: ICD-10-CM

## 2020-09-10 DIAGNOSIS — K80.20 CALCULUS OF GALLBLADDER W/OUT CHOLECYSTITIS W/OUT OBSTRUCTION: ICD-10-CM

## 2020-09-10 SDOH — SOCIAL STABILITY - SOCIAL INSECURITY: DISRUPTION OF FAMILY BY SEPARATION AND DIVORCE: Z63.5

## 2020-10-06 ENCOUNTER — APPOINTMENT (OUTPATIENT)
Dept: GASTROENTEROLOGY | Facility: CLINIC | Age: 45
End: 2020-10-06
Payer: COMMERCIAL

## 2020-10-06 VITALS
HEIGHT: 63 IN | WEIGHT: 129 LBS | TEMPERATURE: 97.7 F | HEART RATE: 77 BPM | BODY MASS INDEX: 22.86 KG/M2 | SYSTOLIC BLOOD PRESSURE: 127 MMHG | DIASTOLIC BLOOD PRESSURE: 76 MMHG

## 2020-10-06 DIAGNOSIS — R10.31 RIGHT LOWER QUADRANT PAIN: ICD-10-CM

## 2020-10-06 DIAGNOSIS — Z80.0 FAMILY HISTORY OF MALIGNANT NEOPLASM OF DIGESTIVE ORGANS: ICD-10-CM

## 2020-10-06 DIAGNOSIS — Z87.442 PERSONAL HISTORY OF URINARY CALCULI: ICD-10-CM

## 2020-10-06 DIAGNOSIS — Z86.010 PERSONAL HISTORY OF COLONIC POLYPS: ICD-10-CM

## 2020-10-06 PROCEDURE — 99204 OFFICE O/P NEW MOD 45 MIN: CPT

## 2020-10-06 PROCEDURE — 82274 ASSAY TEST FOR BLOOD FECAL: CPT | Mod: QW

## 2020-10-06 RX ORDER — LANCETS 28 GAUGE
EACH MISCELLANEOUS
Qty: 1 | Refills: 0 | Status: DISCONTINUED | COMMUNITY
Start: 2017-09-29 | End: 2020-10-06

## 2020-10-06 RX ORDER — SOLIFENACIN SUCCINATE 5 MG/1
5 TABLET, FILM COATED ORAL DAILY
Qty: 30 | Refills: 6 | Status: DISCONTINUED | COMMUNITY
Start: 2017-01-12 | End: 2020-10-06

## 2020-10-06 RX ORDER — BLOOD SUGAR DIAGNOSTIC
STRIP MISCELLANEOUS 3 TIMES DAILY
Qty: 90 | Refills: 5 | Status: DISCONTINUED | COMMUNITY
Start: 2017-09-29 | End: 2020-10-06

## 2020-10-06 RX ORDER — BLOOD-GLUCOSE METER
W/DEVICE KIT MISCELLANEOUS
Qty: 1 | Refills: 0 | Status: DISCONTINUED | COMMUNITY
Start: 2017-09-29 | End: 2020-10-06

## 2020-10-06 NOTE — PHYSICAL EXAM
[General Appearance - Alert] : alert [General Appearance - In No Acute Distress] : in no acute distress [General Appearance - Well Nourished] : well nourished [General Appearance - Well Developed] : well developed [Sclera] : the sclera and conjunctiva were normal [Neck Appearance] : the appearance of the neck was normal [Neck Cervical Mass (___cm)] : no neck mass was observed [Jugular Venous Distention Increased] : there was no jugular-venous distention [Thyroid Diffuse Enlargement] : the thyroid was not enlarged [Thyroid Nodule] : there were no palpable thyroid nodules [Auscultation Breath Sounds / Voice Sounds] : lungs were clear to auscultation bilaterally [Heart Rate And Rhythm] : heart rate was normal and rhythm regular [Heart Sounds] : normal S1 and S2 [Heart Sounds Gallop] : no gallops [Murmurs] : no murmurs [Heart Sounds Pericardial Friction Rub] : no pericardial rub [Full Pulse] : the pedal pulses are present [Edema] : there was no peripheral edema [Bowel Sounds] : normal bowel sounds [Abdomen Soft] : soft [Abdomen Mass (___ Cm)] : no abdominal mass palpated [Abdomen Hernia] : no hernia was discovered [Normal Sphincter Tone] : normal sphincter tone [No Rectal Mass] : no rectal mass [Internal Hemorrhoid] : internal hemorrhoids [Cervical Lymph Nodes Enlarged Posterior Bilaterally] : posterior cervical [Cervical Lymph Nodes Enlarged Anterior Bilaterally] : anterior cervical [Supraclavicular Lymph Nodes Enlarged Bilaterally] : supraclavicular [Inguinal Lymph Nodes Enlarged Bilaterally] : inguinal [Abnormal Walk] : normal gait [Nail Clubbing] : no clubbing  or cyanosis of the fingernails [Musculoskeletal - Swelling] : no joint swelling seen [Skin Color & Pigmentation] : normal skin color and pigmentation [Skin Turgor] : normal skin turgor [] : no rash [Oriented To Time, Place, And Person] : oriented to person, place, and time [Impaired Insight] : insight and judgment were intact [Affect] : the affect was normal [External Hemorrhoid] : no external hemorrhoids [Occult Blood Positive] : stool was negative for occult blood [FreeTextEntry1] : FIT negative

## 2020-10-06 NOTE — ASSESSMENT
[FreeTextEntry1] : 1.  Tubular adenoma, hemorrhoids at colonoscopy 2015; family history of colon cancer (cousin)--rule out metachronous colorectal neoplasia.  Stool guaiac negative with negative fecal immunochemical test on today's exam.\par 2.  Chronic RLQ/hypoumbilical pain--may be musculoskeletal in origin (referred from spine).  Possible underlying IBS, GYN etiology.\par 3.  Type 1 diabetes mellitus.\par 4.  History of anxiety/depression.\par 5.  Status post .\par 6.  Small kidney stone on imaging studies.\par 7.  Allergic to penicillin.\par \par Plan:\par 1.  She will retrieve latest labs for my review.\par 2.  Agree with surveillance colonoscopy--she is aware of the need to adjust her diabetes medicines prior to procedure.  She is also aware that a designated  must be available to take her home, otherwise no anesthesia will be administered. Procedure, rationale, anesthesia plan, and PEG prep instructions were again reviewed and brochure given.\par 3.  Follow-up with GYN.\par 4.  Other recommendations to follow.\par

## 2020-10-06 NOTE — HISTORY OF PRESENT ILLNESS
[FreeTextEntry1] : Over the past year, Jana has been experiencing frequent lower abdominal, back, and knee pain, often worse with bending over, not temporally related to meals, defecation, or urination.  She had been having similar complaints approximately 5 years ago.  She has tried ibuprofen, unclear if it has helped.  She is moving her bowels daily at this point.  Sugars are under better control lately (A1C had been 11, latest 7.6).  A tubular adenoma was removed at baseline colonoscopy 2015.  A cousin was diagnosed with colon cancer at about age 56,  at age 61 of same.

## 2020-10-06 NOTE — CONSULT LETTER
[Dear  ___] : Dear  [unfilled], [Consult Letter:] : I had the pleasure of evaluating your patient, [unfilled]. [Please see my note below.] : Please see my note below. [Consult Closing:] : Thank you very much for allowing me to participate in the care of this patient.  If you have any questions, please do not hesitate to contact me. [Sincerely,] : Sincerely, [FreeTextEntry3] : Gilbert Polo M.D.\par

## 2020-10-17 ENCOUNTER — NON-APPOINTMENT (OUTPATIENT)
Age: 45
End: 2020-10-17

## 2020-10-21 ENCOUNTER — APPOINTMENT (OUTPATIENT)
Dept: GASTROENTEROLOGY | Facility: CLINIC | Age: 45
End: 2020-10-21
Payer: COMMERCIAL

## 2020-10-21 PROCEDURE — 99072 ADDL SUPL MATRL&STAF TM PHE: CPT

## 2020-10-21 PROCEDURE — 45378 DIAGNOSTIC COLONOSCOPY: CPT

## 2020-10-21 PROCEDURE — 84703 CHORIONIC GONADOTROPIN ASSAY: CPT | Mod: QW

## 2020-10-21 RX ORDER — POLYETHYLENE GLYCOL-3350, SODIUM CHLORIDE, POTASSIUM CHLORIDE AND SODIUM BICARBONATE 420; 11.2; 5.72; 1.48 G/438.4G; G/438.4G; G/438.4G; G/438.4G
420 POWDER, FOR SOLUTION ORAL
Qty: 4000 | Refills: 0 | Status: DISCONTINUED | COMMUNITY
Start: 2020-10-06 | End: 2020-10-21
